# Patient Record
Sex: FEMALE | Race: WHITE | NOT HISPANIC OR LATINO | Employment: OTHER | URBAN - METROPOLITAN AREA
[De-identification: names, ages, dates, MRNs, and addresses within clinical notes are randomized per-mention and may not be internally consistent; named-entity substitution may affect disease eponyms.]

---

## 2023-10-07 ENCOUNTER — APPOINTMENT (OUTPATIENT)
Dept: GENERAL RADIOLOGY | Facility: CLINIC | Age: 68
DRG: 516 | End: 2023-10-07
Attending: EMERGENCY MEDICINE
Payer: MEDICARE

## 2023-10-07 ENCOUNTER — APPOINTMENT (OUTPATIENT)
Dept: CT IMAGING | Facility: CLINIC | Age: 68
DRG: 516 | End: 2023-10-07
Attending: EMERGENCY MEDICINE
Payer: MEDICARE

## 2023-10-07 ENCOUNTER — HOSPITAL ENCOUNTER (INPATIENT)
Facility: CLINIC | Age: 68
LOS: 5 days | Discharge: NURSING, REHAB OR LONG-TERM CARE | DRG: 516 | End: 2023-10-12
Attending: EMERGENCY MEDICINE | Admitting: HOSPITALIST
Payer: MEDICARE

## 2023-10-07 DIAGNOSIS — S52.502A CLOSED FRACTURE OF DISTAL END OF LEFT RADIUS, UNSPECIFIED FRACTURE MORPHOLOGY, INITIAL ENCOUNTER: ICD-10-CM

## 2023-10-07 DIAGNOSIS — S43.005A SHOULDER DISLOCATION, LEFT, INITIAL ENCOUNTER: ICD-10-CM

## 2023-10-07 DIAGNOSIS — S82.042A CLOSED DISPLACED COMMINUTED FRACTURE OF LEFT PATELLA, INITIAL ENCOUNTER: ICD-10-CM

## 2023-10-07 DIAGNOSIS — S42.292A OTHER CLOSED DISPLACED FRACTURE OF PROXIMAL END OF LEFT HUMERUS, INITIAL ENCOUNTER: ICD-10-CM

## 2023-10-07 DIAGNOSIS — S82.102A CLOSED FRACTURE OF PROXIMAL END OF LEFT TIBIA, UNSPECIFIED FRACTURE MORPHOLOGY, INITIAL ENCOUNTER: ICD-10-CM

## 2023-10-07 LAB
ANION GAP SERPL CALCULATED.3IONS-SCNC: 11 MMOL/L (ref 7–15)
BASO+EOS+MONOS # BLD AUTO: NORMAL 10*3/UL
BASO+EOS+MONOS NFR BLD AUTO: NORMAL %
BASOPHILS # BLD AUTO: 0 10E3/UL (ref 0–0.2)
BASOPHILS NFR BLD AUTO: 1 %
BUN SERPL-MCNC: 17.4 MG/DL (ref 8–23)
CALCIUM SERPL-MCNC: 8.9 MG/DL (ref 8.8–10.2)
CHLORIDE SERPL-SCNC: 104 MMOL/L (ref 98–107)
CREAT SERPL-MCNC: 1 MG/DL (ref 0.51–0.95)
DEPRECATED HCO3 PLAS-SCNC: 25 MMOL/L (ref 22–29)
EGFRCR SERPLBLD CKD-EPI 2021: 60 ML/MIN/1.73M2
EOSINOPHIL # BLD AUTO: 0.4 10E3/UL (ref 0–0.7)
EOSINOPHIL NFR BLD AUTO: 5 %
ERYTHROCYTE [DISTWIDTH] IN BLOOD BY AUTOMATED COUNT: 12.8 % (ref 10–15)
GLUCOSE SERPL-MCNC: 124 MG/DL (ref 70–99)
HCT VFR BLD AUTO: 41.2 % (ref 35–47)
HGB BLD-MCNC: 14.1 G/DL (ref 11.7–15.7)
IMM GRANULOCYTES # BLD: 0 10E3/UL
IMM GRANULOCYTES NFR BLD: 0 %
LYMPHOCYTES # BLD AUTO: 2.2 10E3/UL (ref 0.8–5.3)
LYMPHOCYTES NFR BLD AUTO: 31 %
MCH RBC QN AUTO: 31.8 PG (ref 26.5–33)
MCHC RBC AUTO-ENTMCNC: 34.2 G/DL (ref 31.5–36.5)
MCV RBC AUTO: 93 FL (ref 78–100)
MONOCYTES # BLD AUTO: 0.4 10E3/UL (ref 0–1.3)
MONOCYTES NFR BLD AUTO: 6 %
NEUTROPHILS # BLD AUTO: 4 10E3/UL (ref 1.6–8.3)
NEUTROPHILS NFR BLD AUTO: 57 %
NRBC # BLD AUTO: 0 10E3/UL
NRBC BLD AUTO-RTO: 0 /100
PLATELET # BLD AUTO: 267 10E3/UL (ref 150–450)
POTASSIUM SERPL-SCNC: 3.7 MMOL/L (ref 3.4–5.3)
RBC # BLD AUTO: 4.43 10E6/UL (ref 3.8–5.2)
SODIUM SERPL-SCNC: 140 MMOL/L (ref 135–145)
WBC # BLD AUTO: 7 10E3/UL (ref 4–11)

## 2023-10-07 PROCEDURE — 99223 1ST HOSP IP/OBS HIGH 75: CPT | Mod: AI | Performed by: HOSPITALIST

## 2023-10-07 PROCEDURE — 99285 EMERGENCY DEPT VISIT HI MDM: CPT | Mod: 25

## 2023-10-07 PROCEDURE — 85025 COMPLETE CBC W/AUTO DIFF WBC: CPT | Performed by: EMERGENCY MEDICINE

## 2023-10-07 PROCEDURE — 73060 X-RAY EXAM OF HUMERUS: CPT | Mod: LT

## 2023-10-07 PROCEDURE — 96375 TX/PRO/DX INJ NEW DRUG ADDON: CPT

## 2023-10-07 PROCEDURE — 250N000011 HC RX IP 250 OP 636: Mod: JZ | Performed by: HOSPITALIST

## 2023-10-07 PROCEDURE — 36415 COLL VENOUS BLD VENIPUNCTURE: CPT | Performed by: EMERGENCY MEDICINE

## 2023-10-07 PROCEDURE — 999N000055 HC STATISTIC END TITIAL CO2 MONITORING

## 2023-10-07 PROCEDURE — 258N000003 HC RX IP 258 OP 636: Performed by: HOSPITALIST

## 2023-10-07 PROCEDURE — 25605 CLTX DST RDL FX/EPHYS SEP W/: CPT | Mod: LT

## 2023-10-07 PROCEDURE — 999N000157 HC STATISTIC RCP TIME EA 10 MIN

## 2023-10-07 PROCEDURE — 250N000011 HC RX IP 250 OP 636: Mod: JZ

## 2023-10-07 PROCEDURE — 250N000011 HC RX IP 250 OP 636: Performed by: EMERGENCY MEDICINE

## 2023-10-07 PROCEDURE — 73590 X-RAY EXAM OF LOWER LEG: CPT | Mod: LT

## 2023-10-07 PROCEDURE — 120N000001 HC R&B MED SURG/OB

## 2023-10-07 PROCEDURE — 96374 THER/PROPH/DIAG INJ IV PUSH: CPT | Mod: 59

## 2023-10-07 PROCEDURE — 2W3DX1Z IMMOBILIZATION OF LEFT LOWER ARM USING SPLINT: ICD-10-PCS | Performed by: EMERGENCY MEDICINE

## 2023-10-07 PROCEDURE — 73200 CT UPPER EXTREMITY W/O DYE: CPT | Mod: LT,MA

## 2023-10-07 PROCEDURE — 73560 X-RAY EXAM OF KNEE 1 OR 2: CPT | Mod: LT

## 2023-10-07 PROCEDURE — 73030 X-RAY EXAM OF SHOULDER: CPT | Mod: LT

## 2023-10-07 PROCEDURE — 999N000065 XR SHOULDER LEFT 2 VIEWS

## 2023-10-07 PROCEDURE — 36415 COLL VENOUS BLD VENIPUNCTURE: CPT | Performed by: HOSPITALIST

## 2023-10-07 PROCEDURE — 73000 X-RAY EXAM OF COLLAR BONE: CPT | Mod: LT

## 2023-10-07 PROCEDURE — 73090 X-RAY EXAM OF FOREARM: CPT | Mod: LT

## 2023-10-07 PROCEDURE — 80048 BASIC METABOLIC PNL TOTAL CA: CPT | Performed by: EMERGENCY MEDICINE

## 2023-10-07 PROCEDURE — 73700 CT LOWER EXTREMITY W/O DYE: CPT | Mod: LT,MA

## 2023-10-07 RX ORDER — OXYCODONE HYDROCHLORIDE 5 MG/1
5 TABLET ORAL EVERY 4 HOURS PRN
Status: DISCONTINUED | OUTPATIENT
Start: 2023-10-07 | End: 2023-10-09

## 2023-10-07 RX ORDER — ONDANSETRON 2 MG/ML
4 INJECTION INTRAMUSCULAR; INTRAVENOUS EVERY 6 HOURS PRN
Status: DISCONTINUED | OUTPATIENT
Start: 2023-10-07 | End: 2023-10-09

## 2023-10-07 RX ORDER — ACETAMINOPHEN 325 MG/1
975 TABLET ORAL EVERY 8 HOURS
Status: DISCONTINUED | OUTPATIENT
Start: 2023-10-07 | End: 2023-10-12 | Stop reason: HOSPADM

## 2023-10-07 RX ORDER — PROPOFOL 10 MG/ML
1 INJECTION, EMULSION INTRAVENOUS ONCE
Status: COMPLETED | OUTPATIENT
Start: 2023-10-07 | End: 2023-10-07

## 2023-10-07 RX ORDER — HYDROMORPHONE HCL IN WATER/PF 6 MG/30 ML
0.2 PATIENT CONTROLLED ANALGESIA SYRINGE INTRAVENOUS ONCE
Status: COMPLETED | OUTPATIENT
Start: 2023-10-07 | End: 2023-10-07

## 2023-10-07 RX ORDER — HYDROMORPHONE HCL IN WATER/PF 6 MG/30 ML
0.2 PATIENT CONTROLLED ANALGESIA SYRINGE INTRAVENOUS
Status: DISCONTINUED | OUTPATIENT
Start: 2023-10-07 | End: 2023-10-12 | Stop reason: HOSPADM

## 2023-10-07 RX ORDER — LATANOPROST 50 UG/ML
1 SOLUTION/ DROPS OPHTHALMIC DAILY
COMMUNITY

## 2023-10-07 RX ORDER — SODIUM CHLORIDE 9 MG/ML
INJECTION, SOLUTION INTRAVENOUS CONTINUOUS
Status: DISCONTINUED | OUTPATIENT
Start: 2023-10-07 | End: 2023-10-11

## 2023-10-07 RX ORDER — TIMOLOL MALEATE 5 MG/ML
1 SOLUTION/ DROPS OPHTHALMIC EVERY MORNING
COMMUNITY

## 2023-10-07 RX ORDER — ONDANSETRON 4 MG/1
4 TABLET, ORALLY DISINTEGRATING ORAL EVERY 6 HOURS PRN
Status: DISCONTINUED | OUTPATIENT
Start: 2023-10-07 | End: 2023-10-09

## 2023-10-07 RX ORDER — HYDROMORPHONE HCL IN WATER/PF 6 MG/30 ML
PATIENT CONTROLLED ANALGESIA SYRINGE INTRAVENOUS
Status: COMPLETED
Start: 2023-10-07 | End: 2023-10-07

## 2023-10-07 RX ORDER — FLUMAZENIL 0.1 MG/ML
0.2 INJECTION, SOLUTION INTRAVENOUS
Status: ACTIVE | OUTPATIENT
Start: 2023-10-07 | End: 2023-10-08

## 2023-10-07 RX ADMIN — HYDROMORPHONE HYDROCHLORIDE 0.2 MG: 0.2 INJECTION, SOLUTION INTRAMUSCULAR; INTRAVENOUS; SUBCUTANEOUS at 19:30

## 2023-10-07 RX ADMIN — PROPOFOL 70 MG: 10 INJECTION, EMULSION INTRAVENOUS at 15:30

## 2023-10-07 RX ADMIN — SODIUM CHLORIDE: 9 INJECTION, SOLUTION INTRAVENOUS at 20:24

## 2023-10-07 RX ADMIN — HYDROMORPHONE HYDROCHLORIDE 0.2 MG: 0.2 INJECTION, SOLUTION INTRAMUSCULAR; INTRAVENOUS; SUBCUTANEOUS at 11:44

## 2023-10-07 RX ADMIN — HYDROMORPHONE HYDROCHLORIDE 0.2 MG: 0.2 INJECTION, SOLUTION INTRAMUSCULAR; INTRAVENOUS; SUBCUTANEOUS at 22:05

## 2023-10-07 ASSESSMENT — ACTIVITIES OF DAILY LIVING (ADL)
ADLS_ACUITY_SCORE: 39

## 2023-10-07 NOTE — ED NOTES
[facility]  ED Nurse Handoff Report    ED Chief complaint: Fall  . ED Diagnosis:   Final diagnoses:   Other closed displaced fracture of proximal end of left humerus, initial encounter   Closed fracture of distal end of left radius, unspecified fracture morphology, initial encounter   Closed displaced comminuted fracture of left patella, initial encounter   Closed fracture of proximal end of left tibia, unspecified fracture morphology, initial encounter       Allergies:   Allergies   Allergen Reactions    Codeine        Code Status: Full Code    Activity level - Baseline/Home:  independent and walker.  Activity Level - Current:   assist of 2.   Lift room needed: No.   Bariatric: No   Needed: No   Isolation: No.   Infection: Not Applicable.     Respiratory status: Room air    Vital Signs (within 30 minutes):   Vitals:    10/07/23 1234 10/07/23 1300 10/07/23 1345 10/07/23 1400   BP: (!) 192/88 (!) 193/82 (!) 189/77 (!) 192/88   Pulse: 52 51 55 56   Resp:       Temp:       TempSrc:       SpO2: 100% 100% 100% 100%       Cardiac Rhythm:  ,      Pain level:    Patient confused: No.   Patient Falls Risk: patient and family education.   Elimination Status:  not yet       Focused Assessment:      Musculoskeletal (Adult)Musculoskeletal WDL: all (pt comes in from [facility] where she fell forward onto knee then onto her arm that wAs straightened out, pt assisted up to sit on walker by 3 people. pt unable to straighten left leg and unable to move left arm due to pain.)General Mobility: significantly impairedExtremity Movement: LUE; LLELUE Extremity Movement: active ROM severely impairedLLE Extremity Movement: active ROM severely impaired       Abnormal Results:   Labs Ordered and Resulted from Time of ED Arrival to Time of ED Departure   BASIC METABOLIC PANEL - Abnormal       Result Value    Sodium 140      Potassium 3.7      Chloride 104      Carbon Dioxide (CO2) 25      Anion Gap 11      Urea  Nitrogen 17.4      Creatinine 1.00 (*)     GFR Estimate 60 (*)     Calcium 8.9      Glucose 124 (*)    CBC WITH PLATELETS AND DIFFERENTIAL    WBC Count 7.0      RBC Count 4.43      Hemoglobin 14.1      Hematocrit 41.2      MCV 93      MCH 31.8      MCHC 34.2      RDW 12.8      Platelet Count 267      % Neutrophils 57      % Lymphocytes 31      % Monocytes 6      Mids % (Monos, Eos, Basos)        % Eosinophils 5      % Basophils 1      % Immature Granulocytes 0      NRBCs per 100 WBC 0      Absolute Neutrophils 4.0      Absolute Lymphocytes 2.2      Absolute Monocytes 0.4      Mids Abs (Monos, Eos, Basos)        Absolute Eosinophils 0.4      Absolute Basophils 0.0      Absolute Immature Granulocytes 0.0      Absolute NRBCs 0.0          Radius/Ulna XR,  PA &LAT, left   Final Result   IMPRESSION: Acute distal radial metaphysis fracture with mild impaction and dorsal displacement. There is intra-articular extension. Subtle linear lucency in the distal ulna on oblique view is concerning for an acute nondisplaced fracture. Surrounding    soft tissue swelling. There is normal joint alignment. Osteopenia. Vascular calcification.      CT Tibia Fibula Lower Leg Left wo Contrast   Final Result   IMPRESSION:   1.  Acute comminuted and displaced patellar fracture.   2.  Lateral tibial plateau impaction fracture posteriorly.   3.  Nondisplaced fracture line extends to the medial tibial plateau and tibial tuberosity.    4.  Large lipohemarthrosis.   5.  Other ancillary findings as described above.         XR Knee Left 1/2 Views   Final Result   IMPRESSION: Comminuted distracted left patella fracture. Left knee lipohemarthrosis. No distal femur, proximal tibia or proximal fibula fracture. Mild degenerative change in the medial and patellofemoral compartments of the left knee. Arterial    calcification. Knee soft tissue swelling.      NOTE: ABNORMAL REPORT      THE DICTATION ABOVE DESCRIBES AN ABNORMALITY FOR WHICH FOLLOW-UP IS  NEEDED.          XR Tibia and Fibula Left 2 Views   Final Result   IMPRESSION: Anatomic alignment left tibia and fibula. Subtle linear lucencies in the proximal left tibial metaphysis could represent summation artifact or nondisplaced fracture. No definitive fibula fracture. No focal periosteal reaction. Diffuse bone    demineralization. Arterial calcification and soft tissue calcinosis. Mid to distal leg soft tissue swelling.         XR Shoulder Left G/E 3 Views   Final Result   IMPRESSION: Mildly impacted and displaced left proximal humerus fracture centered at the humeral neck. Fracture line extension likely into the greater tuberosity. No AC separation or glenohumeral joint dislocation. Moderate acromioclavicular joint    osteoarthritis. Mild glenohumeral joint space narrowing. Small lung volumes with left basilar atelectasis. Mid to distal left humerus appears intact. No left elbow joint malalignment. Subacromial spur.      NOTE: ABNORMAL REPORT      THE DICTATION ABOVE DESCRIBES AN ABNORMALITY FOR WHICH FOLLOW-UP IS NEEDED.          Humerus XR,  G/E 2 views, left   Final Result   IMPRESSION: Mildly impacted and displaced left proximal humerus fracture centered at the humeral neck. Fracture line extension likely into the greater tuberosity. No AC separation or glenohumeral joint dislocation. Moderate acromioclavicular joint    osteoarthritis. Mild glenohumeral joint space narrowing. Small lung volumes with left basilar atelectasis. Mid to distal left humerus appears intact. No left elbow joint malalignment. Subacromial spur.      NOTE: ABNORMAL REPORT      THE DICTATION ABOVE DESCRIBES AN ABNORMALITY FOR WHICH FOLLOW-UP IS NEEDED.          Clavicle XR, left   Final Result   IMPRESSION: No acute displaced left clavicle fracture identified. No clavicle joint malalignment. Redemonstrated is a left proximal humerus fracture. Degenerative change left AC joint. Subacromial spur. Degenerative change visualized  cervicothoracic    spine.             Treatments provided: PIV,LABS, DILAUDID, XRAY, CT,  Family Comments: AT BEDSIDE  OBS brochure/video discussed/provided to patient:  No  ED Medications:   Medications   HYDROmorphone (DILAUDID) injection 0.2 mg (0 mg Intravenous Hold 10/7/23 1144)   HYDROmorphone (DILAUDID) 0.2 MG/ML injection (0.2 mg  $Given 10/7/23 1144)       Drips infusing:  No  For the majority of the shift this patient was Green.   Interventions performed were NONE.    Sepsis treatment initiated: No    Cares/treatment/interventions/medications to be completed following ED care: VITALS, POSITIONING, PULM. TOILET, POSSIBLY SURGERY, PAIN MEDS, BLOOD PRESSURE.    ED Nurse Name: Alyssa Peres RN  2:07 PM  RECEIVING UNIT ED HANDOFF REVIEW    Above ED Nurse Handoff Report was reviewed: Yes  Reviewed by: Heavenly Mathis RN on October 7, 2023 at 9:03 PM

## 2023-10-07 NOTE — PROGRESS NOTES
An ETCO2 monitor was placed on the pt with 2-5LPM bled in. The Ambu bag, suction, and airways were setup and present in the room, but not needed. Pt was able to maintain airway throughout the procedure with no intervention needed. ETCO2 levels were maintained between 23-38.        Brian Kc, RT

## 2023-10-07 NOTE — ED PROVIDER NOTES
History     Chief Complaint:  Fall    HPI   Maura Downing is a 68 year old female presenting to the emergency department for evaluation of a fall via EMS.  Patient reports she was shopping at [employer] earlier today when she slipped on the ground, and subsequently fell forward.  She landed primarily on her left knee, and also braced with her left arm.  She noted immediate onset of pain to her left knee, as well as left shoulder girdle.  She also notes pain just posterior to the shoulder.  She denies head injury nor loss of consciousness.  Denies injuries to her right arm, or right leg.  She has been unable to bear weight since the time of injury.  EMS was called to the scene, and she required 3 people to assist with standing.  She was given 100 mcg of fentanyl in route, which brought her pain from 9-8/10.  She is not on anticoagulation.  She denies any neck or lower back pain.      Independent Historian:   Spouse/Partner - They report fall at [employer] earlier today    Review of External Notes:   None       Medications:    Denies current medications.    Past Medical History:    History of COVID    Past Surgical History:    No past surgical history on file.     Physical Exam   Patient Vitals for the past 24 hrs:   BP Temp Temp src Pulse Resp SpO2 Weight   10/07/23 1537 (!) 178/94 -- -- 64 12 100 % --   10/07/23 1530 -- -- -- 65 (!) 6 100 % --   10/07/23 1525 (!) 161/79 -- -- -- -- -- --   10/07/23 1516 (!) 158/97 -- -- 63 12 100 % --   10/07/23 1514 -- -- -- 61 10 100 % --   10/07/23 1511 (!) 181/85 -- -- 60 10 100 % --   10/07/23 1506 (!) 166/80 -- -- 61 12 100 % --   10/07/23 1502 (!) 154/108 -- -- 59 16 100 % --   10/07/23 1501 (!) 154/108 -- -- 56 (!) 6 97 % --   10/07/23 1459 -- -- -- 63 (!) 6 98 % --   10/07/23 1458 (!) 143/106 -- -- 63 -- 94 % --   10/07/23 1456 (!) 143/106 -- -- 60 19 100 % --   10/07/23 1442 (!) 165/87 -- -- -- -- -- 76 kg (167 lb 8 oz)   10/07/23 1400 (!) 192/88 -- -- 56 -- 100 % --   10/07/23  1345 (!) 189/77 -- -- 55 -- 100 % --   10/07/23 1300 (!) 193/82 -- -- 51 -- 100 % --   10/07/23 1234 (!) 192/88 -- -- 52 -- 100 % --   10/07/23 1142 (!) 195/95 -- -- -- -- 98 % --   10/07/23 1131 (!) 222/89 -- -- -- -- -- --   10/07/23 1127 (!) 222/89 -- -- 52 -- 97 % --   10/07/23 1122 -- 97.4  F (36.3  C) Oral 51 20 97 % --        Physical Exam  General:   Well-nourished   Speaking in full sentences  Eyes:   Conjunctiva without injection or scleral icterus  ENT:   Moist mucous membranes   Nares patent   Pinnae normal  Neck:   Full ROM   No stiffness appreciated  Resp:   Lungs CTAB   No crackles, wheezing or audible rubs   Good air movement  CV:    Normal rate, regular rhythm   S1 and S2 present   No murmur, gallop or rub  GI:   BS present   Abdomen soft without distention   Non-tender to light and deep palpation   No guarding or rebound tenderness  Skin:   Warm, dry, well perfused   No rashes or open wounds on exposed skin  MSK:   LUE:   Tenderness to palpation about proximal humerus and distal clavicle   Arm held in adduction   No obvious deformity   No elbow or wrist tenderness   2+ radial pulse   Compartments about upper and lower are are soft   LLE:   No tenderness about left hip   Tenderness and swelling about left knee with deformity   Extremity warm, well-perfused with 2+ DP and PT pulse  Neuro:   Alert   Answers questions appropriately   Decreased sensation to light touch over LLE distal to knee   Able to wiggle toes  Psych:   Normal affect, normal mood      Emergency Department Course   Imaging:  XR Shoulder Left 2 Views   Final Result   IMPRESSION: Proximal humerus fracture with similar displacement and impaction. There is mild inferior subluxation of the humeral head relative to the glenoid. Otherwise unchanged.      Radius/Ulna XR,  PA &LAT, left   Final Result   IMPRESSION: Acute distal radial metaphysis fracture with mild impaction and dorsal displacement. There is intra-articular extension. Subtle  linear lucency in the distal ulna on oblique view is concerning for an acute nondisplaced fracture. Surrounding    soft tissue swelling. There is normal joint alignment. Osteopenia. Vascular calcification.      CT Tibia Fibula Lower Leg Left wo Contrast   Final Result   IMPRESSION:   1.  Acute comminuted and displaced patellar fracture.   2.  Lateral tibial plateau impaction fracture posteriorly.   3.  Nondisplaced fracture line extends to the medial tibial plateau and tibial tuberosity.    4.  Large lipohemarthrosis.   5.  Other ancillary findings as described above.         XR Knee Left 1/2 Views   Final Result   IMPRESSION: Comminuted distracted left patella fracture. Left knee lipohemarthrosis. No distal femur, proximal tibia or proximal fibula fracture. Mild degenerative change in the medial and patellofemoral compartments of the left knee. Arterial    calcification. Knee soft tissue swelling.      NOTE: ABNORMAL REPORT      THE DICTATION ABOVE DESCRIBES AN ABNORMALITY FOR WHICH FOLLOW-UP IS NEEDED.          XR Tibia and Fibula Left 2 Views   Final Result   IMPRESSION: Anatomic alignment left tibia and fibula. Subtle linear lucencies in the proximal left tibial metaphysis could represent summation artifact or nondisplaced fracture. No definitive fibula fracture. No focal periosteal reaction. Diffuse bone    demineralization. Arterial calcification and soft tissue calcinosis. Mid to distal leg soft tissue swelling.         XR Shoulder Left G/E 3 Views   Final Result   IMPRESSION: Mildly impacted and displaced left proximal humerus fracture centered at the humeral neck. Fracture line extension likely into the greater tuberosity. No AC separation or glenohumeral joint dislocation. Moderate acromioclavicular joint    osteoarthritis. Mild glenohumeral joint space narrowing. Small lung volumes with left basilar atelectasis. Mid to distal left humerus appears intact. No left elbow joint malalignment. Subacromial spur.       NOTE: ABNORMAL REPORT      THE DICTATION ABOVE DESCRIBES AN ABNORMALITY FOR WHICH FOLLOW-UP IS NEEDED.          Humerus XR,  G/E 2 views, left   Final Result   IMPRESSION: Mildly impacted and displaced left proximal humerus fracture centered at the humeral neck. Fracture line extension likely into the greater tuberosity. No AC separation or glenohumeral joint dislocation. Moderate acromioclavicular joint    osteoarthritis. Mild glenohumeral joint space narrowing. Small lung volumes with left basilar atelectasis. Mid to distal left humerus appears intact. No left elbow joint malalignment. Subacromial spur.      NOTE: ABNORMAL REPORT      THE DICTATION ABOVE DESCRIBES AN ABNORMALITY FOR WHICH FOLLOW-UP IS NEEDED.          Clavicle XR, left   Final Result   IMPRESSION: No acute displaced left clavicle fracture identified. No clavicle joint malalignment. Redemonstrated is a left proximal humerus fracture. Degenerative change left AC joint. Subacromial spur. Degenerative change visualized cervicothoracic    spine.         CT Shoulder Left w/o Contrast    (Results Pending)      Report per radiology    Laboratory:  Labs Ordered and Resulted from Time of ED Arrival to Time of ED Departure   BASIC METABOLIC PANEL - Abnormal       Result Value    Sodium 140      Potassium 3.7      Chloride 104      Carbon Dioxide (CO2) 25      Anion Gap 11      Urea Nitrogen 17.4      Creatinine 1.00 (*)     GFR Estimate 60 (*)     Calcium 8.9      Glucose 124 (*)    CBC WITH PLATELETS AND DIFFERENTIAL    WBC Count 7.0      RBC Count 4.43      Hemoglobin 14.1      Hematocrit 41.2      MCV 93      MCH 31.8      MCHC 34.2      RDW 12.8      Platelet Count 267      % Neutrophils 57      % Lymphocytes 31      % Monocytes 6      Mids % (Monos, Eos, Basos)        % Eosinophils 5      % Basophils 1      % Immature Granulocytes 0      NRBCs per 100 WBC 0      Absolute Neutrophils 4.0      Absolute Lymphocytes 2.2      Absolute Monocytes 0.4       Mids Abs (Monos, Eos, Basos)        Absolute Eosinophils 0.4      Absolute Basophils 0.0      Absolute Immature Granulocytes 0.0      Absolute NRBCs 0.0          Procedures     Sedation     Procedure: Procedural Sedation    Sedation Level: Deep    Indication: Joint reduction    Consent: Written from Patient     Universal protocol: Universal protocol was followed and time out conducted just prior to starting procedure, confirming patient identity, site/side, procedure, patient position, and availability of correct equipment and implants.     Last PO Intake: Emergent procedure    ASA Class: Class 2 - A patient with mild systemic disease     Exam:  Mallampati:  Grade 1 - Soft palate, uvula, and pillars visible    Lungs: Clear   Heart: Regular rate and rhythm     Medication: Propofol  Dose: 70 mg     Monitoring:  Monitoring consisted of heart rate, cardiac, continuous pulse oximetry, frequent blood pressure checks.   There was constant attendance by RN until patient recovered and constant attendance by physician until patient stable.   Intubation and emergency airway equipment available.     Response: Vital signs stable, oxygen saturations greater than 92%       Patient Status: Post procedure patient was alert.     Total Physician Drug Administration / Monitoring Time: 11 minutes.     Patient was monitored during recovery and returned to pre-procedure baseline.         Splint Placement     Procedure: Splint Placement     Indication: Fracture    Consent: Verbal     Location: Left Wrist    Procedure detail:   Splint was applied by Myself  Splint type: Sugar-tong   Splint materilal: Plaster  After placement I checked and adjusted the fit as needed to ensure proper positioning/fit   Sensation and circulation are intact after splint placement     Patient Status: The patient tolerated the procedure well: Yes. There were no complications      Emergency Department Course & Assessments:             Interventions:  Medications    flumazenil (ROMAZICON) injection 0.2 mg (has no administration in time range)   HYDROmorphone (DILAUDID) injection 0.2 mg (0.2 mg Intravenous $Given 10/7/23 1144)   HYDROmorphone (DILAUDID) 0.2 MG/ML injection (0.2 mg  $Given 10/7/23 1144)   propofol (DIPRIVAN) injection 10 mg/mL vial (70 mg Intravenous $Given 10/7/23 1530)        Assessments:  See below    Independent Interpretation (X-rays, CTs, rhythm strip):  XR reviewed demonstrating patellar fracture    Consultations/Discussion of Management or Tests:   ED Course as of 10/07/23 1707   Sat Oct 07, 2023   1244 Patient re-evaluated   1422 Spoke with Dr. Felton from orthopedic surgery.  He is concerned for dislocation of the left shoulder.   1429 Patient re-checked.   1541 Procedural sedation completed   1653 Spoke with Dr. Felton from orthopedic [facility]       Social Determinants of Health affecting care:   None    Disposition:  The patient was admitted to the hospital under the care of Dr. English.     Impression & Plan        Medical Decision Making:  Maura Downing is a 68 yr old F presenting to the emergency department for evaluation of a fall.  VS on presentation reveal elevated BP, which improved during patient's emergency department course.  Patient's history consistent with a mechanical fall.  She sustained injuries primarily to her left lower extremity, as well as left upper extremity.  She denies head injury nor loss of consciousness.  Initial radiographs were obtained, demonstrating a comminuted distracted left patellar fracture, left knee lipohemarthrosis, though no knee dislocation.  Subsequent CT of the tib-fib confirms acute, comminuted displaced patellar fracture as well as a lateral tibial plateau impaction fracture posteriorly.  A additional nondisplaced fracture line extends to the medial tibial plateau and tibial tuberosity.  Patient additionally has an acute distal radial metaphyseal fracture with impaction and mild dorsal displacement as well  as intra-articular extension.  Based on imaging, do not feel further reduction maneuver indicated.  Sugar-tong splint applied and patient feeling improved with splint application.  X-ray of the shoulder demonstrates a mildly impacted, displaced left proximal humerus fracture centered at the humeral neck, with fracture extension into the greater tuberosity.  Per radiology, no clear dislocation, though following conversation with orthopedic surgery, who has reviewed imaging, it was felt that patient did have anterior subluxation versus dislocation of the glenohumeral joint.  This was discussed at length with the patient and spouse.  She underwent procedural sedation following informed verbal and written consent.  Unfortunately, repeat x-ray demonstrated persistence and subluxation/dislocation of the humeral head relative the glenoid.  This was again reviewed with orthopedic surgery, who recommended CT of the shoulder for further clarification, though do not anticipate surgical intervention in the OR this evening.  As such, we will plan for hospital admission for orthopedic consultation.  She is to remain n.p.o. after midnight.  Neurovascular status intact at this time.  Patient's questions have been answered prior to admission.      Diagnosis:    ICD-10-CM    1. Other closed displaced fracture of proximal end of left humerus, initial encounter  S42.292A       2. Closed fracture of distal end of left radius, unspecified fracture morphology, initial encounter  S52.502A       3. Closed displaced comminuted fracture of left patella, initial encounter  S82.042A       4. Closed fracture of proximal end of left tibia, unspecified fracture morphology, initial encounter  S82.102A       5. Shoulder dislocation, left, initial encounter  S43.005A            10/7/2023   Claus Wnag MD Roach, Brian Donald, MD  10/07/23 1710

## 2023-10-07 NOTE — H&P
"[facility]    History and Physical  Hospitalist       Date of Admission:  10/7/2023    Assessment & Plan   Maura Downing is a 68 year old female with glaucoma who presents after a fall.    #Mechanical Fall c/b left humeral fracture with dislocation of humeral head, left radial fracture along with left patellar fracture: Patient notes that she was hospitalized with COVID-19 2 years ago and since that time she has been using a walker to ambulate as \"my balance has not been quite right.\"  She notes that her last fall was in the early summer when she tripped over something.  Patient was shopping at [employer] on the day of admission when she slipped on the ground and fell forward.  She landed on her left knee and left arm.  She had immediate pain to her left knee and her left shoulder along with left wrist.  There is no loss of consciousness.  No preceding palpitations or chest pain.  She denies any chest pain.  No headache.  No neck or back pain.  She did not hit her head.  She was unable to bear weight since the injury.  She is not on anticoagulation.  EMS was called and she was given 100 mcg of fentanyl in route.  -ER, patient afebrile and nontachycardic.  Hypertensive with systolic blood pressures anywhere from 160-200 range.  Saturating okay on room air.  CBC and BMP unremarkable.  Initial x-ray of the left clavicle, humerus and shoulder showed left proximal humeral fracture with extension into the greater tuberosity.  X-ray of the left forearm also showed acute distal radial metaphysis fracture.  X-ray of the tibia and fibula showed subtle lucencies in the proximal left tibial metaphysis that could represent a nondisplaced fracture.  X-ray of the knee showed commuted distracted left patellar fracture.  She underwent a CT tibia and fibula that showed acute commuted and displaced patellar fracture with a lateral tibial plateau impaction fracture posteriorly.  ER physician discussed with on call " "orthopedics who noted that the humeral head was dislocated.  She underwent a left shoulder reduction in the ER.  Repeat x-ray is in process.  She was given pain medications.  -ER physician is following up with orthopedics with repeat left shoulder x-ray.  Suspect left humeral head is still dislocated and she will need a CT of the humeral head.  She may need procedure tomorrow with orthopedics.  -We will make the patient n.p.o. at midnight, IV fluids, as needed pain medications available.  -Bedrest overnight.    Addendum: Discussed with Dr. Felton from orthopedics.  He reviewed all the scans including her left shoulder CT.  It does look like she will require operative repair for her knee.  She will be n.p.o. at midnight.  I did order type and screen with her a.m. labs.    #Systolic murmur: Patient notes that she has not been told that she has a cardiac murmur in the past.  He had an echocardiogram in November 2021 that showed no valvular issues.  We will repeat a TTE so we have 1 within our system.  Discussed with patient and she is agreeable.    #Glaucoma: Resume eyedrops once verified    DVT Prophylaxis: Pneumatic Compression Devices  Code Status: Full Code  Dispo: Admit inpatient    Rodrigo English MD    Primary Care Physician   Physician No Ref-Primary    Chief Complaint   Fall    History is obtained from the patient, patient's chart and discussed with ER physician.  Also discussed with patient's  at bedside.    History of Present Illness   Maura Downing is a 68 year old female with glaucoma who presents after a fall.    Patient notes that she was hospitalized with COVID-19 2 years ago and since that time she has been using a walker to ambulate as \"my balance has not been quite right.\"  She notes that her last fall was in the early summer when she tripped over something.  Patient was shopping at [employer] on the day of admission when she slipped on the ground and fell forward.  She landed on her left knee and " left arm.  She had immediate pain to her left knee and her left shoulder along with left wrist.  There is no loss of consciousness.  No preceding palpitations or chest pain.  She denies any chest pain.  No headache.  No neck or back pain.  She did not hit her head.  She was unable to bear weight since the injury.  She is not on anticoagulation.  EMS was called and she was given 100 mcg of fentanyl in route.    In the ER, patient afebrile and nontachycardic.  Hypertensive with systolic blood pressures anywhere from 160-200 range.  Saturating okay on room air.  CBC and BMP unremarkable.  Initial x-ray of the left clavicle, humerus and shoulder showed left proximal humeral fracture with extension into the greater tuberosity.  X-ray of the left forearm also showed acute distal radial metaphysis fracture.  X-ray of the tibia and fibula showed subtle lucencies in the proximal left tibial metaphysis that could represent a nondisplaced fracture.  X-ray of the knee showed commuted distracted left patellar fracture.  She underwent a CT tibia and fibula that showed acute commuted and displaced patellar fracture with a lateral tibial plateau impaction fracture posteriorly.  ER physician discussed with on call orthopedics who noted that the humeral head was dislocated.  She underwent a left shoulder reduction in the ER.  Repeat x-ray is in process.  She was given pain medications.    Past Medical History    Glaucoma    Past Surgical History   None    Prior to Admission Medications   None     Allergies   Allergies   Allergen Reactions    Codeine        Social History   I have reviewed this patient's social history and updated it with pertinent information if needed. Maura Downing    Patient lives at home with her .  Uses a walker to ambulate.  Non-smoker nondrinker.    Family History   I have reviewed this patient's family history and updated it with pertinent information if needed.   No family history on file.    Review  of Systems   The 10 point Review of Systems is negative other than noted in the HPI or here.     Physical Exam   Temp: 97.4  F (36.3  C) Temp src: Oral BP: (!) 178/94 Pulse: 64   Resp: 12 SpO2: 100 % O2 Device: Nasal cannula Oxygen Delivery: 5 LPM  Vital Signs with Ranges  Temp:  [97.4  F (36.3  C)] 97.4  F (36.3  C)  Pulse:  [51-65] 64  Resp:  [6-20] 12  BP: (143-222)/() 178/94  SpO2:  [94 %-100 %] 100 %  167 lbs 8 oz    Constitutional: NAD, Nontoxic.  Nasal cannula in place.  HEENT: Normocephalic, MMM, no elevation of JVD noted  Respiratory: Nl WOB, Clear bilaterally, No wheezes, no crackles  Cardiovascular: Regular, systolic murmur is noted.  GI: BS+, NT, ND, no rebound or guarding  Lymph/Hematologic: No bruising. No cervical LAD  Skin: No rash  Musculoskeletal: Patient with left wrist and forearm in splint.  Left shoulder with some swelling noted.  Tenderness noted.  She does have sensation to her left fingers intact.  She has swelling and pain to her left knee and has pain with movement.  Distal left lower extremity sensation is intact.  Pulses are palpable.  Neurologic: A&Ox3, Answers appropriately. CNII-XII intact. Moves all extremities but keeps left arm and left leg still secondary to pain. No tremor  Psychiatric: Calm    Data   Data reviewed today:  I personally reviewed   Recent Labs   Lab 10/07/23  1145   WBC 7.0   HGB 14.1   MCV 93         POTASSIUM 3.7   CHLORIDE 104   CO2 25   BUN 17.4   CR 1.00*   ANIONGAP 11   SHARON 8.9   *       Recent Results (from the past 24 hour(s))   Clavicle XR, left    Narrative    EXAM: XR CLAVICLE LEFT 2 VIEWS  LOCATION: [facility]  DATE: 10/07/2023.    INDICATION: Fall. Pain.  COMPARISON: Shoulder and humerus radiographic exam today.      Impression    IMPRESSION: No acute displaced left clavicle fracture identified. No clavicle joint malalignment. Redemonstrated is a left proximal humerus fracture. Degenerative change left  AC joint. Subacromial spur. Degenerative change visualized cervicothoracic   spine.     Humerus XR,  G/E 2 views, left    Narrative    EXAM: XR SHOULDER LEFT G/E 3 VIEWS, XR HUMERUS LEFT G/E 2 VIEWS  LOCATION: [facility]  DATE: 10/07/2023    INDICATION: Fall. Humerus pain.  COMPARISON: None.      Impression    IMPRESSION: Mildly impacted and displaced left proximal humerus fracture centered at the humeral neck. Fracture line extension likely into the greater tuberosity. No AC separation or glenohumeral joint dislocation. Moderate acromioclavicular joint   osteoarthritis. Mild glenohumeral joint space narrowing. Small lung volumes with left basilar atelectasis. Mid to distal left humerus appears intact. No left elbow joint malalignment. Subacromial spur.    NOTE: ABNORMAL REPORT    THE DICTATION ABOVE DESCRIBES AN ABNORMALITY FOR WHICH FOLLOW-UP IS NEEDED.      XR Shoulder Left G/E 3 Views    Narrative    EXAM: XR SHOULDER LEFT G/E 3 VIEWS, XR HUMERUS LEFT G/E 2 VIEWS  LOCATION: [facility]  DATE: 10/07/2023    INDICATION: Fall. Humerus pain.  COMPARISON: None.      Impression    IMPRESSION: Mildly impacted and displaced left proximal humerus fracture centered at the humeral neck. Fracture line extension likely into the greater tuberosity. No AC separation or glenohumeral joint dislocation. Moderate acromioclavicular joint   osteoarthritis. Mild glenohumeral joint space narrowing. Small lung volumes with left basilar atelectasis. Mid to distal left humerus appears intact. No left elbow joint malalignment. Subacromial spur.    NOTE: ABNORMAL REPORT    THE DICTATION ABOVE DESCRIBES AN ABNORMALITY FOR WHICH FOLLOW-UP IS NEEDED.      XR Tibia and Fibula Left 2 Views    Narrative    EXAM: XR TIBIA AND FIBULA LEFT 2 VIEWS  LOCATION: [facility]  DATE: 10/07/2023    INDICATION: Fall. Pain.  COMPARISON: None.      Impression    IMPRESSION: Anatomic alignment  left tibia and fibula. Subtle linear lucencies in the proximal left tibial metaphysis could represent summation artifact or nondisplaced fracture. No definitive fibula fracture. No focal periosteal reaction. Diffuse bone   demineralization. Arterial calcification and soft tissue calcinosis. Mid to distal leg soft tissue swelling.     XR Knee Left 1/2 Views    Narrative    EXAM: XR KNEE LEFT 1/2 VIEWS  LOCATION: [facility]  DATE: 10/07/2023    INDICATION: Fall, pain.  COMPARISON: None.      Impression    IMPRESSION: Comminuted distracted left patella fracture. Left knee lipohemarthrosis. No distal femur, proximal tibia or proximal fibula fracture. Mild degenerative change in the medial and patellofemoral compartments of the left knee. Arterial   calcification. Knee soft tissue swelling.    NOTE: ABNORMAL REPORT    THE DICTATION ABOVE DESCRIBES AN ABNORMALITY FOR WHICH FOLLOW-UP IS NEEDED.      CT Tibia Fibula Lower Leg Left wo Contrast    Narrative    EXAM: CT TIBIA FIBULA LOWER LEG LEFT WO CONTRAST  LOCATION: [facility]  DATE: 10/7/2023    INDICATION: left proximal knee pain  COMPARISON: Radiographs from earlier today  TECHNIQUE: Noncontrast. Axial, sagittal and coronal thin-section reconstruction. Dose reduction techniques were used.     FINDINGS:     BONES AND JOINTS:  -Acute transversely oriented fracture of the mid patella with posterior displacement of the distal fracture fragment. There is intra-articular extension and multiple small fracture fragments. Acute impaction fracture of the lateral tibial plateau   posteriorly with 6 mm articular surface depression. There is a nondisplaced fracture line in the proximal tibia anteriorly which extends to the medial tibial plateau and tibial tuberosity. Mild cartilage thinning in the medial compartment. Osteopenia.   Large lipohemarthrosis. No intra-articular body.    SOFT TISSUES:  -Mild soft tissue swelling anteriorly. No  discrete fluid collection. Semimembranosus fatty muscle atrophy. Atherosclerosis.      Impression    IMPRESSION:  1.  Acute comminuted and displaced patellar fracture.  2.  Lateral tibial plateau impaction fracture posteriorly.  3.  Nondisplaced fracture line extends to the medial tibial plateau and tibial tuberosity.   4.  Large lipohemarthrosis.  5.  Other ancillary findings as described above.     Radius/Ulna XR,  PA &LAT, left    Narrative    EXAM: XR FOREARM LEFT 2 VIEWS  LOCATION: [facility]  DATE: 10/7/2023    INDICATION: pain, fall  COMPARISON: None.      Impression    IMPRESSION: Acute distal radial metaphysis fracture with mild impaction and dorsal displacement. There is intra-articular extension. Subtle linear lucency in the distal ulna on oblique view is concerning for an acute nondisplaced fracture. Surrounding   soft tissue swelling. There is normal joint alignment. Osteopenia. Vascular calcification.       Clinically Significant Risk Factors Present on Admission

## 2023-10-07 NOTE — ED NOTES
Musculoskeletal (Adult)Musculoskeletal WDL: all (pt comes in from [facility] where she fell forward onto knee then onto her arm that wAs straightened out, pt assisted up to sit on walker by 3 people. pt unable to straighten left leg and unable to move left arm due to pain.)General Mobility: significantly impairedExtremity Movement: LUE; LLELUE Extremity Movement: active ROM severely impairedLLE Extremity Movement: active ROM severely impaired

## 2023-10-07 NOTE — PHARMACY-ADMISSION MEDICATION HISTORY
Pharmacist Admission Medication History    Admission medication history is complete. The information provided in this note is only as accurate as the sources available at the time of the update.    Information Source(s): Patient via in-person    Pertinent Information: None    Changes made to PTA medication list:  Added: All  Deleted: None  Changed: None    Medication Affordability:  Not including over the counter (OTC) medications, was there a time in the past 3 months when you did not take your medications as prescribed because of cost?: No    Allergies reviewed with patient and updates made in EHR: yes    Medication History Completed By: Blaine Mills RPH 10/7/2023 5:01 PM    PTA Med List   Medication Sig Last Dose    latanoprost (XALATAN) 0.005 % ophthalmic solution Place 1 drop into both eyes daily 10/6/2023 at PM    timolol maleate (TIMOPTIC) 0.5 % ophthalmic solution Place 1 drop into both eyes every morning 10/7/2023 at AM

## 2023-10-07 NOTE — ED TRIAGE NOTES
pt comes in from [facility] where she fell forward onto knee then onto her arm that wAs straightened out, pt assisted up to sit on walker by 3 people. pt unable to straighten left leg and unable to move left arm due to pain. pt got 100mcg of fent from ems brought pain from 9/10 to 8/10. vss no LOC did not hit head. back is sore.

## 2023-10-08 ENCOUNTER — APPOINTMENT (OUTPATIENT)
Dept: CARDIOLOGY | Facility: CLINIC | Age: 68
DRG: 516 | End: 2023-10-08
Attending: HOSPITALIST
Payer: MEDICARE

## 2023-10-08 ENCOUNTER — ANESTHESIA EVENT (OUTPATIENT)
Dept: SURGERY | Facility: CLINIC | Age: 68
DRG: 516 | End: 2023-10-08
Payer: MEDICARE

## 2023-10-08 ENCOUNTER — APPOINTMENT (OUTPATIENT)
Dept: GENERAL RADIOLOGY | Facility: CLINIC | Age: 68
DRG: 516 | End: 2023-10-08
Attending: ORTHOPAEDIC SURGERY
Payer: MEDICARE

## 2023-10-08 ENCOUNTER — ANESTHESIA (OUTPATIENT)
Dept: SURGERY | Facility: CLINIC | Age: 68
DRG: 516 | End: 2023-10-08
Payer: MEDICARE

## 2023-10-08 LAB
ABO/RH(D): NORMAL
ABO/RH(D): NORMAL
ANION GAP SERPL CALCULATED.3IONS-SCNC: 11 MMOL/L (ref 7–15)
ANTIBODY SCREEN: NEGATIVE
BUN SERPL-MCNC: 22 MG/DL (ref 8–23)
CALCIUM SERPL-MCNC: 8.7 MG/DL (ref 8.8–10.2)
CHLORIDE SERPL-SCNC: 108 MMOL/L (ref 98–107)
CREAT SERPL-MCNC: 1.07 MG/DL (ref 0.51–0.95)
DEPRECATED HCO3 PLAS-SCNC: 22 MMOL/L (ref 22–29)
EGFRCR SERPLBLD CKD-EPI 2021: 56 ML/MIN/1.73M2
ERYTHROCYTE [DISTWIDTH] IN BLOOD BY AUTOMATED COUNT: 13 % (ref 10–15)
GLUCOSE SERPL-MCNC: 166 MG/DL (ref 70–99)
HCT VFR BLD AUTO: 35.4 % (ref 35–47)
HGB BLD-MCNC: 12.1 G/DL (ref 11.7–15.7)
LVEF ECHO: NORMAL
MCH RBC QN AUTO: 31.8 PG (ref 26.5–33)
MCHC RBC AUTO-ENTMCNC: 34.2 G/DL (ref 31.5–36.5)
MCV RBC AUTO: 93 FL (ref 78–100)
PLATELET # BLD AUTO: 229 10E3/UL (ref 150–450)
POTASSIUM SERPL-SCNC: 4 MMOL/L (ref 3.4–5.3)
RBC # BLD AUTO: 3.81 10E6/UL (ref 3.8–5.2)
SODIUM SERPL-SCNC: 141 MMOL/L (ref 135–145)
SPECIMEN EXPIRATION DATE: NORMAL
SPECIMEN EXPIRATION DATE: NORMAL
WBC # BLD AUTO: 11.9 10E3/UL (ref 4–11)

## 2023-10-08 PROCEDURE — 0QSF04Z REPOSITION LEFT PATELLA WITH INTERNAL FIXATION DEVICE, OPEN APPROACH: ICD-10-PCS | Performed by: ORTHOPAEDIC SURGERY

## 2023-10-08 PROCEDURE — 360N000084 HC SURGERY LEVEL 4 W/ FLUORO, PER MIN: Performed by: ORTHOPAEDIC SURGERY

## 2023-10-08 PROCEDURE — 258N000003 HC RX IP 258 OP 636: Performed by: ORTHOPAEDIC SURGERY

## 2023-10-08 PROCEDURE — 250N000009 HC RX 250: Performed by: NURSE ANESTHETIST, CERTIFIED REGISTERED

## 2023-10-08 PROCEDURE — 250N000025 HC SEVOFLURANE, PER MIN: Performed by: ORTHOPAEDIC SURGERY

## 2023-10-08 PROCEDURE — 85027 COMPLETE CBC AUTOMATED: CPT | Performed by: HOSPITALIST

## 2023-10-08 PROCEDURE — 999N000208 ECHOCARDIOGRAM COMPLETE

## 2023-10-08 PROCEDURE — 250N000011 HC RX IP 250 OP 636: Mod: JZ | Performed by: NURSE ANESTHETIST, CERTIFIED REGISTERED

## 2023-10-08 PROCEDURE — 370N000017 HC ANESTHESIA TECHNICAL FEE, PER MIN: Performed by: ORTHOPAEDIC SURGERY

## 2023-10-08 PROCEDURE — 255N000002 HC RX 255 OP 636: Performed by: HOSPITALIST

## 2023-10-08 PROCEDURE — 258N000003 HC RX IP 258 OP 636: Performed by: ANESTHESIOLOGY

## 2023-10-08 PROCEDURE — 710N000009 HC RECOVERY PHASE 1, LEVEL 1, PER MIN: Performed by: ORTHOPAEDIC SURGERY

## 2023-10-08 PROCEDURE — 272N000001 HC OR GENERAL SUPPLY STERILE: Performed by: ORTHOPAEDIC SURGERY

## 2023-10-08 PROCEDURE — 258N000003 HC RX IP 258 OP 636: Performed by: NURSE ANESTHETIST, CERTIFIED REGISTERED

## 2023-10-08 PROCEDURE — 80048 BASIC METABOLIC PNL TOTAL CA: CPT | Performed by: HOSPITALIST

## 2023-10-08 PROCEDURE — 86901 BLOOD TYPING SEROLOGIC RH(D): CPT | Performed by: HOSPITALIST

## 2023-10-08 PROCEDURE — 250N000011 HC RX IP 250 OP 636: Mod: JZ | Performed by: HOSPITALIST

## 2023-10-08 PROCEDURE — 999N000141 HC STATISTIC PRE-PROCEDURE NURSING ASSESSMENT: Performed by: ORTHOPAEDIC SURGERY

## 2023-10-08 PROCEDURE — 250N000013 HC RX MED GY IP 250 OP 250 PS 637: Performed by: ORTHOPAEDIC SURGERY

## 2023-10-08 PROCEDURE — 99232 SBSQ HOSP IP/OBS MODERATE 35: CPT | Performed by: INTERNAL MEDICINE

## 2023-10-08 PROCEDURE — 250N000009 HC RX 250: Performed by: INTERNAL MEDICINE

## 2023-10-08 PROCEDURE — 250N000011 HC RX IP 250 OP 636: Mod: JZ | Performed by: ORTHOPAEDIC SURGERY

## 2023-10-08 PROCEDURE — 36415 COLL VENOUS BLD VENIPUNCTURE: CPT | Performed by: HOSPITALIST

## 2023-10-08 PROCEDURE — 250N000013 HC RX MED GY IP 250 OP 250 PS 637: Performed by: HOSPITALIST

## 2023-10-08 PROCEDURE — C1713 ANCHOR/SCREW BN/BN,TIS/BN: HCPCS | Performed by: ORTHOPAEDIC SURGERY

## 2023-10-08 PROCEDURE — 120N000001 HC R&B MED SURG/OB

## 2023-10-08 PROCEDURE — 999N000179 XR SURGERY CARM FLUORO LESS THAN 5 MIN W STILLS: Mod: TC

## 2023-10-08 PROCEDURE — 258N000003 HC RX IP 258 OP 636: Performed by: HOSPITALIST

## 2023-10-08 PROCEDURE — 93306 TTE W/DOPPLER COMPLETE: CPT | Mod: 26 | Performed by: INTERNAL MEDICINE

## 2023-10-08 PROCEDURE — 250N000011 HC RX IP 250 OP 636: Performed by: ORTHOPAEDIC SURGERY

## 2023-10-08 PROCEDURE — 271N000001 HC OR GENERAL SUPPLY NON-STERILE: Performed by: ORTHOPAEDIC SURGERY

## 2023-10-08 PROCEDURE — 250N000011 HC RX IP 250 OP 636: Mod: JZ | Performed by: ANESTHESIOLOGY

## 2023-10-08 DEVICE — IMP ANCHOR SUTURE Q-FIX 2.8MM 25-2800: Type: IMPLANTABLE DEVICE | Site: KNEE | Status: FUNCTIONAL

## 2023-10-08 RX ORDER — FENTANYL CITRATE 50 UG/ML
25 INJECTION, SOLUTION INTRAMUSCULAR; INTRAVENOUS EVERY 5 MIN PRN
Status: DISCONTINUED | OUTPATIENT
Start: 2023-10-08 | End: 2023-10-08 | Stop reason: HOSPADM

## 2023-10-08 RX ORDER — POLYETHYLENE GLYCOL 3350 17 G/17G
17 POWDER, FOR SOLUTION ORAL DAILY
Status: DISCONTINUED | OUTPATIENT
Start: 2023-10-09 | End: 2023-10-12 | Stop reason: HOSPADM

## 2023-10-08 RX ORDER — SODIUM CHLORIDE, SODIUM LACTATE, POTASSIUM CHLORIDE, CALCIUM CHLORIDE 600; 310; 30; 20 MG/100ML; MG/100ML; MG/100ML; MG/100ML
INJECTION, SOLUTION INTRAVENOUS CONTINUOUS
Status: DISCONTINUED | OUTPATIENT
Start: 2023-10-08 | End: 2023-10-08 | Stop reason: HOSPADM

## 2023-10-08 RX ORDER — CEFAZOLIN SODIUM/WATER 2 G/20 ML
2 SYRINGE (ML) INTRAVENOUS SEE ADMIN INSTRUCTIONS
Status: DISCONTINUED | OUTPATIENT
Start: 2023-10-08 | End: 2023-10-08 | Stop reason: HOSPADM

## 2023-10-08 RX ORDER — NALOXONE HYDROCHLORIDE 0.4 MG/ML
0.4 INJECTION, SOLUTION INTRAMUSCULAR; INTRAVENOUS; SUBCUTANEOUS
Status: DISCONTINUED | OUTPATIENT
Start: 2023-10-08 | End: 2023-10-12 | Stop reason: HOSPADM

## 2023-10-08 RX ORDER — AMOXICILLIN 250 MG
1 CAPSULE ORAL 2 TIMES DAILY
Status: DISCONTINUED | OUTPATIENT
Start: 2023-10-08 | End: 2023-10-12 | Stop reason: HOSPADM

## 2023-10-08 RX ORDER — FENTANYL CITRATE 50 UG/ML
50 INJECTION, SOLUTION INTRAMUSCULAR; INTRAVENOUS EVERY 5 MIN PRN
Status: DISCONTINUED | OUTPATIENT
Start: 2023-10-08 | End: 2023-10-08 | Stop reason: HOSPADM

## 2023-10-08 RX ORDER — LABETALOL HYDROCHLORIDE 5 MG/ML
10 INJECTION, SOLUTION INTRAVENOUS
Status: DISCONTINUED | OUTPATIENT
Start: 2023-10-08 | End: 2023-10-08 | Stop reason: HOSPADM

## 2023-10-08 RX ORDER — NALOXONE HYDROCHLORIDE 0.4 MG/ML
0.2 INJECTION, SOLUTION INTRAMUSCULAR; INTRAVENOUS; SUBCUTANEOUS
Status: DISCONTINUED | OUTPATIENT
Start: 2023-10-08 | End: 2023-10-12 | Stop reason: HOSPADM

## 2023-10-08 RX ORDER — CEFAZOLIN SODIUM 1 G/3ML
1 INJECTION, POWDER, FOR SOLUTION INTRAMUSCULAR; INTRAVENOUS EVERY 8 HOURS
Status: COMPLETED | OUTPATIENT
Start: 2023-10-09 | End: 2023-10-09

## 2023-10-08 RX ORDER — FENTANYL CITRATE 50 UG/ML
INJECTION, SOLUTION INTRAMUSCULAR; INTRAVENOUS PRN
Status: DISCONTINUED | OUTPATIENT
Start: 2023-10-08 | End: 2023-10-08

## 2023-10-08 RX ORDER — DEXAMETHASONE SODIUM PHOSPHATE 4 MG/ML
INJECTION, SOLUTION INTRA-ARTICULAR; INTRALESIONAL; INTRAMUSCULAR; INTRAVENOUS; SOFT TISSUE PRN
Status: DISCONTINUED | OUTPATIENT
Start: 2023-10-08 | End: 2023-10-08

## 2023-10-08 RX ORDER — ONDANSETRON 4 MG/1
4 TABLET, ORALLY DISINTEGRATING ORAL EVERY 6 HOURS PRN
Status: DISCONTINUED | OUTPATIENT
Start: 2023-10-08 | End: 2023-10-12 | Stop reason: HOSPADM

## 2023-10-08 RX ORDER — BISACODYL 10 MG
10 SUPPOSITORY, RECTAL RECTAL DAILY PRN
Status: DISCONTINUED | OUTPATIENT
Start: 2023-10-08 | End: 2023-10-12 | Stop reason: HOSPADM

## 2023-10-08 RX ORDER — HYDRALAZINE HYDROCHLORIDE 20 MG/ML
2.5-5 INJECTION INTRAMUSCULAR; INTRAVENOUS EVERY 10 MIN PRN
Status: DISCONTINUED | OUTPATIENT
Start: 2023-10-08 | End: 2023-10-08 | Stop reason: HOSPADM

## 2023-10-08 RX ORDER — ALBUTEROL SULFATE 0.83 MG/ML
2.5 SOLUTION RESPIRATORY (INHALATION) EVERY 4 HOURS PRN
Status: DISCONTINUED | OUTPATIENT
Start: 2023-10-08 | End: 2023-10-08

## 2023-10-08 RX ORDER — PROPOFOL 10 MG/ML
INJECTION, EMULSION INTRAVENOUS PRN
Status: DISCONTINUED | OUTPATIENT
Start: 2023-10-08 | End: 2023-10-08

## 2023-10-08 RX ORDER — ONDANSETRON 2 MG/ML
INJECTION INTRAMUSCULAR; INTRAVENOUS PRN
Status: DISCONTINUED | OUTPATIENT
Start: 2023-10-08 | End: 2023-10-08

## 2023-10-08 RX ORDER — LIDOCAINE HYDROCHLORIDE 20 MG/ML
INJECTION, SOLUTION INFILTRATION; PERINEURAL PRN
Status: DISCONTINUED | OUTPATIENT
Start: 2023-10-08 | End: 2023-10-08

## 2023-10-08 RX ORDER — HYDRALAZINE HYDROCHLORIDE 20 MG/ML
2.5-5 INJECTION INTRAMUSCULAR; INTRAVENOUS EVERY 10 MIN PRN
Status: DISCONTINUED | OUTPATIENT
Start: 2023-10-08 | End: 2023-10-08

## 2023-10-08 RX ORDER — SODIUM CHLORIDE, SODIUM LACTATE, POTASSIUM CHLORIDE, CALCIUM CHLORIDE 600; 310; 30; 20 MG/100ML; MG/100ML; MG/100ML; MG/100ML
INJECTION, SOLUTION INTRAVENOUS CONTINUOUS
Status: DISCONTINUED | OUTPATIENT
Start: 2023-10-08 | End: 2023-10-11

## 2023-10-08 RX ORDER — HYDROMORPHONE HCL IN WATER/PF 6 MG/30 ML
0.4 PATIENT CONTROLLED ANALGESIA SYRINGE INTRAVENOUS EVERY 5 MIN PRN
Status: DISCONTINUED | OUTPATIENT
Start: 2023-10-08 | End: 2023-10-08

## 2023-10-08 RX ORDER — HYDROMORPHONE HCL IN WATER/PF 6 MG/30 ML
0.4 PATIENT CONTROLLED ANALGESIA SYRINGE INTRAVENOUS EVERY 5 MIN PRN
Status: DISCONTINUED | OUTPATIENT
Start: 2023-10-08 | End: 2023-10-08 | Stop reason: HOSPADM

## 2023-10-08 RX ORDER — ONDANSETRON 2 MG/ML
4 INJECTION INTRAMUSCULAR; INTRAVENOUS EVERY 30 MIN PRN
Status: DISCONTINUED | OUTPATIENT
Start: 2023-10-08 | End: 2023-10-08 | Stop reason: HOSPADM

## 2023-10-08 RX ORDER — CEFAZOLIN SODIUM/WATER 2 G/20 ML
2 SYRINGE (ML) INTRAVENOUS
Status: COMPLETED | OUTPATIENT
Start: 2023-10-08 | End: 2023-10-08

## 2023-10-08 RX ORDER — ONDANSETRON 2 MG/ML
4 INJECTION INTRAMUSCULAR; INTRAVENOUS EVERY 6 HOURS PRN
Status: DISCONTINUED | OUTPATIENT
Start: 2023-10-08 | End: 2023-10-12 | Stop reason: HOSPADM

## 2023-10-08 RX ORDER — HYDROXYZINE HYDROCHLORIDE 10 MG/1
10 TABLET, FILM COATED ORAL EVERY 6 HOURS PRN
Status: DISCONTINUED | OUTPATIENT
Start: 2023-10-08 | End: 2023-10-12 | Stop reason: HOSPADM

## 2023-10-08 RX ORDER — LIDOCAINE 40 MG/G
CREAM TOPICAL
Status: DISCONTINUED | OUTPATIENT
Start: 2023-10-08 | End: 2023-10-12 | Stop reason: HOSPADM

## 2023-10-08 RX ORDER — ONDANSETRON 4 MG/1
4 TABLET, ORALLY DISINTEGRATING ORAL EVERY 30 MIN PRN
Status: DISCONTINUED | OUTPATIENT
Start: 2023-10-08 | End: 2023-10-08 | Stop reason: HOSPADM

## 2023-10-08 RX ORDER — LATANOPROST 50 UG/ML
1 SOLUTION/ DROPS OPHTHALMIC EVERY EVENING
Status: DISCONTINUED | OUTPATIENT
Start: 2023-10-08 | End: 2023-10-12 | Stop reason: HOSPADM

## 2023-10-08 RX ORDER — PROCHLORPERAZINE MALEATE 5 MG
5 TABLET ORAL EVERY 6 HOURS PRN
Status: DISCONTINUED | OUTPATIENT
Start: 2023-10-08 | End: 2023-10-12 | Stop reason: HOSPADM

## 2023-10-08 RX ORDER — HYDROMORPHONE HCL IN WATER/PF 6 MG/30 ML
0.2 PATIENT CONTROLLED ANALGESIA SYRINGE INTRAVENOUS EVERY 5 MIN PRN
Status: DISCONTINUED | OUTPATIENT
Start: 2023-10-08 | End: 2023-10-08 | Stop reason: HOSPADM

## 2023-10-08 RX ORDER — ASPIRIN 325 MG
325 TABLET, DELAYED RELEASE (ENTERIC COATED) ORAL DAILY
Status: DISCONTINUED | OUTPATIENT
Start: 2023-10-08 | End: 2023-10-12 | Stop reason: HOSPADM

## 2023-10-08 RX ORDER — LABETALOL HYDROCHLORIDE 5 MG/ML
10 INJECTION, SOLUTION INTRAVENOUS
Status: DISCONTINUED | OUTPATIENT
Start: 2023-10-08 | End: 2023-10-08

## 2023-10-08 RX ORDER — HYDROMORPHONE HCL IN WATER/PF 6 MG/30 ML
0.2 PATIENT CONTROLLED ANALGESIA SYRINGE INTRAVENOUS EVERY 5 MIN PRN
Status: DISCONTINUED | OUTPATIENT
Start: 2023-10-08 | End: 2023-10-08

## 2023-10-08 RX ORDER — ROPIVACAINE HYDROCHLORIDE 7.5 MG/ML
INJECTION, SOLUTION EPIDURAL; PERINEURAL PRN
Status: DISCONTINUED | OUTPATIENT
Start: 2023-10-08 | End: 2023-10-08 | Stop reason: HOSPADM

## 2023-10-08 RX ORDER — GLYCOPYRROLATE 0.2 MG/ML
INJECTION, SOLUTION INTRAMUSCULAR; INTRAVENOUS PRN
Status: DISCONTINUED | OUTPATIENT
Start: 2023-10-08 | End: 2023-10-08

## 2023-10-08 RX ORDER — LIDOCAINE 40 MG/G
CREAM TOPICAL
Status: DISCONTINUED | OUTPATIENT
Start: 2023-10-08 | End: 2023-10-08 | Stop reason: HOSPADM

## 2023-10-08 RX ORDER — ALBUTEROL SULFATE 0.83 MG/ML
2.5 SOLUTION RESPIRATORY (INHALATION) EVERY 4 HOURS PRN
Status: DISCONTINUED | OUTPATIENT
Start: 2023-10-08 | End: 2023-10-08 | Stop reason: HOSPADM

## 2023-10-08 RX ORDER — TIMOLOL MALEATE 5 MG/ML
1 SOLUTION/ DROPS OPHTHALMIC EVERY MORNING
Status: DISCONTINUED | OUTPATIENT
Start: 2023-10-08 | End: 2023-10-12 | Stop reason: HOSPADM

## 2023-10-08 RX ADMIN — PROPOFOL 200 MG: 10 INJECTION, EMULSION INTRAVENOUS at 16:28

## 2023-10-08 RX ADMIN — HYDROMORPHONE HYDROCHLORIDE 0.5 MG: 1 INJECTION, SOLUTION INTRAMUSCULAR; INTRAVENOUS; SUBCUTANEOUS at 17:21

## 2023-10-08 RX ADMIN — SODIUM CHLORIDE: 9 INJECTION, SOLUTION INTRAVENOUS at 06:34

## 2023-10-08 RX ADMIN — FENTANYL CITRATE 50 MCG: 50 INJECTION INTRAMUSCULAR; INTRAVENOUS at 16:53

## 2023-10-08 RX ADMIN — Medication 2 G: at 16:24

## 2023-10-08 RX ADMIN — HYDROMORPHONE HYDROCHLORIDE 0.2 MG: 0.2 INJECTION, SOLUTION INTRAMUSCULAR; INTRAVENOUS; SUBCUTANEOUS at 11:31

## 2023-10-08 RX ADMIN — OXYCODONE HYDROCHLORIDE 5 MG: 5 TABLET ORAL at 05:54

## 2023-10-08 RX ADMIN — ACETAMINOPHEN 975 MG: 325 TABLET, FILM COATED ORAL at 05:53

## 2023-10-08 RX ADMIN — OXYCODONE HYDROCHLORIDE 5 MG: 5 TABLET ORAL at 22:30

## 2023-10-08 RX ADMIN — LATANOPROST 1 DROP: 50 SOLUTION OPHTHALMIC at 19:54

## 2023-10-08 RX ADMIN — HYDROMORPHONE HYDROCHLORIDE 0.2 MG: 0.2 INJECTION, SOLUTION INTRAMUSCULAR; INTRAVENOUS; SUBCUTANEOUS at 03:17

## 2023-10-08 RX ADMIN — GLYCOPYRROLATE 0.2 MG: 0.2 INJECTION, SOLUTION INTRAMUSCULAR; INTRAVENOUS at 16:51

## 2023-10-08 RX ADMIN — HUMAN ALBUMIN MICROSPHERES AND PERFLUTREN 4 ML: 10; .22 INJECTION, SOLUTION INTRAVENOUS at 13:04

## 2023-10-08 RX ADMIN — PHENYLEPHRINE HYDROCHLORIDE 100 MCG: 10 INJECTION INTRAVENOUS at 16:37

## 2023-10-08 RX ADMIN — HYDROXYZINE HYDROCHLORIDE 10 MG: 10 TABLET ORAL at 22:30

## 2023-10-08 RX ADMIN — ASPIRIN 325 MG: 325 TABLET, COATED ORAL at 19:50

## 2023-10-08 RX ADMIN — SODIUM CHLORIDE, POTASSIUM CHLORIDE, SODIUM LACTATE AND CALCIUM CHLORIDE: 600; 310; 30; 20 INJECTION, SOLUTION INTRAVENOUS at 16:24

## 2023-10-08 RX ADMIN — HYDROMORPHONE HYDROCHLORIDE 0.2 MG: 0.2 INJECTION, SOLUTION INTRAMUSCULAR; INTRAVENOUS; SUBCUTANEOUS at 09:22

## 2023-10-08 RX ADMIN — HYDRALAZINE HYDROCHLORIDE 2.5 MG: 20 INJECTION, SOLUTION INTRAMUSCULAR; INTRAVENOUS at 17:59

## 2023-10-08 RX ADMIN — HYDROMORPHONE HYDROCHLORIDE 0.2 MG: 0.2 INJECTION, SOLUTION INTRAMUSCULAR; INTRAVENOUS; SUBCUTANEOUS at 13:32

## 2023-10-08 RX ADMIN — FENTANYL CITRATE 50 MCG: 50 INJECTION INTRAMUSCULAR; INTRAVENOUS at 16:47

## 2023-10-08 RX ADMIN — HYDRALAZINE HYDROCHLORIDE 2.5 MG: 20 INJECTION, SOLUTION INTRAMUSCULAR; INTRAVENOUS at 17:49

## 2023-10-08 RX ADMIN — HYDROMORPHONE HYDROCHLORIDE 0.2 MG: 0.2 INJECTION, SOLUTION INTRAMUSCULAR; INTRAVENOUS; SUBCUTANEOUS at 19:04

## 2023-10-08 RX ADMIN — DEXAMETHASONE SODIUM PHOSPHATE 8 MG: 4 INJECTION, SOLUTION INTRA-ARTICULAR; INTRALESIONAL; INTRAMUSCULAR; INTRAVENOUS; SOFT TISSUE at 16:28

## 2023-10-08 RX ADMIN — PHENYLEPHRINE HYDROCHLORIDE 100 MCG: 10 INJECTION INTRAVENOUS at 16:43

## 2023-10-08 RX ADMIN — SODIUM CHLORIDE, POTASSIUM CHLORIDE, SODIUM LACTATE AND CALCIUM CHLORIDE: 600; 310; 30; 20 INJECTION, SOLUTION INTRAVENOUS at 16:56

## 2023-10-08 RX ADMIN — TIMOLOL MALEATE 1 DROP: 5 SOLUTION/ DROPS OPHTHALMIC at 09:14

## 2023-10-08 RX ADMIN — LIDOCAINE HYDROCHLORIDE 50 MG: 20 INJECTION, SOLUTION INFILTRATION; PERINEURAL at 16:28

## 2023-10-08 RX ADMIN — ONDANSETRON 4 MG: 2 INJECTION INTRAMUSCULAR; INTRAVENOUS at 17:15

## 2023-10-08 RX ADMIN — FENTANYL CITRATE 100 MCG: 50 INJECTION INTRAMUSCULAR; INTRAVENOUS at 16:28

## 2023-10-08 RX ADMIN — SODIUM CHLORIDE, POTASSIUM CHLORIDE, SODIUM LACTATE AND CALCIUM CHLORIDE: 600; 310; 30; 20 INJECTION, SOLUTION INTRAVENOUS at 19:54

## 2023-10-08 RX ADMIN — HYDRALAZINE HYDROCHLORIDE 5 MG: 20 INJECTION, SOLUTION INTRAMUSCULAR; INTRAVENOUS at 18:20

## 2023-10-08 RX ADMIN — PHENYLEPHRINE HYDROCHLORIDE 100 MCG: 10 INJECTION INTRAVENOUS at 16:51

## 2023-10-08 ASSESSMENT — ACTIVITIES OF DAILY LIVING (ADL)
ADLS_ACUITY_SCORE: 32
ADLS_ACUITY_SCORE: 26
ADLS_ACUITY_SCORE: 32
ADLS_ACUITY_SCORE: 32
ADLS_ACUITY_SCORE: 26
ADLS_ACUITY_SCORE: 32

## 2023-10-08 NOTE — ANESTHESIA CARE TRANSFER NOTE
Patient: Maura Downing    Procedure: Procedure(s):  OPEN REDUCTION INTERNAL FIXATION, FRACTURE, PATELLA       Diagnosis: Closed displaced comminuted fracture of left patella, initial encounter [S82.042A]  Shoulder dislocation, left, initial encounter [S43.005A]  Diagnosis Additional Information: No value filed.    Anesthesia Type:   General     Note:    Oropharynx: oropharynx clear of all foreign objects and spontaneously breathing  Level of Consciousness: awake  Oxygen Supplementation: face mask  Level of Supplemental Oxygen (L/min / FiO2): 8  Independent Airway: airway patency satisfactory and stable  Dentition: dentition unchanged  Vital Signs Stable: post-procedure vital signs reviewed and stable  Report to RN Given: handoff report given  Patient transferred to: PACU  Comments: Pt to recovery.  Spontaneous respirations and exchanging well.  Pt making needs known.  Report given to RN.      Handoff Report: Identifed the Patient, Identified the Reponsible Provider, Reviewed the pertinent medical history, Discussed the surgical course, Reviewed Intra-OP anesthesia mangement and issues during anesthesia, Set expectations for post-procedure period and Allowed opportunity for questions and acknowledgement of understanding      Vitals:  Vitals Value Taken Time   /82    Temp 99.1  F (37.3  C) 10/08/23 1738   Pulse 65 10/08/23 1739   Resp 5 10/08/23 1739   SpO2 100 % 10/08/23 1739   Vitals shown include unvalidated device data.    Electronically Signed By: NARENDRA Solorzano CRNA  October 8, 2023  5:41 PM

## 2023-10-08 NOTE — PROGRESS NOTES
Notified provider about indwelling kendrick catheter discussed removal or continued need.    Did provider choose to remove indwelling kendrick catheter? No    Provider's kendrick indication for keeping indwelling kendrick catheter: Retention.    Is there an order for indwelling kendrick catheter? Yes    *If there is a plan to keep kendrick catheter in place at discharge daily notification with provider is not necessary, but please add a notation in the treatment team sticky note that the patient will be discharging with the catheter.       Place during Morning Shift for retention  Bladder Scan 510 after placement 550 out.

## 2023-10-08 NOTE — ANESTHESIA PROCEDURE NOTES
Airway       Patient location during procedure: OR  Staff -        CRNA: Tori Silverman APRN CRNA       Performed By: CRNA  Consent for Airway        Urgency: elective  Indications and Patient Condition       Indications for airway management: gunjan-procedural       Induction type:intravenous       Mask difficulty assessment: 0 - not attempted    Final Airway Details       Final airway type: supraglottic airway    Supraglottic Airway Details        Type: LMA       Brand: I-Gel       LMA size: 4    Post intubation assessment        Placement verified by: capnometry, equal breath sounds and chest rise        Number of attempts at approach: 1       Number of other approaches attempted: 0       Secured with: commercial tube gomez       Ease of procedure: easy       Dentition: Intact and Unchanged

## 2023-10-08 NOTE — PLAN OF CARE
Goal Outcome Evaluation:      Plan of Care Reviewed With: patient    Overall Patient Progress: no changeOverall Patient Progress: no change       Patient vital signs are at baseline: No,  Reason:  BP elevated  Patient able to ambulate as they were prior to admission or with assist devices provided by therapies during their stay:  No,  Reason:  bedrest  Patient MUST void prior to discharge:  Yes  Patient able to tolerate oral intake:  Yes  Pain has adequate pain control using Oral analgesics:  No,  Reason:  IV dilaudid  Does patient have an identified :  Yes  Has goal D/C date and time been discussed with patient:  No,  Reason:  surgery tomorrow    Pt A&O. CMS intact. LUE splinted. On room air. NPO @ MN.

## 2023-10-08 NOTE — PROGRESS NOTES
[facility]    Hospitalist Progress Note  Name: Maura Downing    MRN: [id number]  Provider:  David Ugarte DO  Date of Service: 10/08/2023    Summary of Stay: Maura Downing is a 68 year old female with a history of recurrent falls since her covid infection 2 years ago and ambulates with a walker, glaucoma, systolic murmur admitted on 10/7/2023 after a mechanical fall while at [employer]. in the emergency department, the patient was found to have a temperature of 97.4  F, heart rate 52, respiratory rate 20, blood pressure 195/95, SPO2 90% on room air.  Initial lab work showed BUN/creatinine 17.4/1.0, glucose 124, CBC within normal limits.  X-ray of the left forearm revealed an acute distal radial metaphyseal fracture with mild impaction and dorsal displacement and intra-articular extension with subtle linear lucency in the distal ulna on oblique view concerning for acute nondisplaced fracture.  Left shoulder and humeral x-ray showed mildly impacted and displaced left proximal humerus fracture centered at the humeral neck with fracture line extension likely into the greater tuberosity.  Left tibia and fibular and knee x-rays showed comminuted distracted left patellar fracture with left knee lipohemarthrosis.  CT left shoulder showed acute proximal left humerus impaction fracture with comminution with large glenohumeral joint lipohemarthrosis involving the subdeltoid bursa, chronic appearing T5 vertebral body compression fracture.  CT of the left tibia and fibula showed acute comminuted and displaced patellar fracture, lateral tibial plateau impaction fracture posteriorly, nondisplaced fracture line extending to the medial tibial plateau and tibial tuberosity, large lipohemarthrosis.  The patient was made n.p.o. and orthopedic surgery was consulted to see the patient    TODAY'S PLAN:  Appreciate Orthopedic Surgery recommendations.  Plan for OR this afternoon to address patellar fx.  Pain control per  ortho.  Resume PTA home meds (eye drops).  Plan for echocardiogram today for murmur on exam.  Overall, pt low risk for cardiac event during surgery.  At baseline, uses a walker.  Also, pt bladder scan overnight for 450 mL.  Will add post void residuals.  If need to straight cath, would just place kendrick as pt will be going for surgery this afternoon.    Problem List:   Mechanical Fall  Recurrent Falls  L Humeral Fracture with Dislocation of Humeral Head  L Radial Fracture  L Patellar Fracture  - Appreciate Orthopedic surgery recommendations - OR this afternoon  - NPO for OR  - IVF until surgery  - Pain control per ortho  - PT/OT after OR    Systolic Murmur  - Echocardiogram pending    Glaucoma  - Resume PTA latanoprost and timolol eye drops    Chronic T5 Compression Fracture  - As seen on CT L shoulder in the ED    I spent 46 minutes in reviewing this patient's labs, imaging, medications, medical history.  In addition time was spent interviewing the patient, communicating with family, and medical decision making.     DVT Prophylaxis: Pneumatic Compression Devices  Code Status: Full Code  Diet: NPO for Medical/Clinical Reasons Except for: Meds    Kendrick Catheter: Not present  Disposition: Expected discharge in 1-2 days to home vs TCU. Goals prior to discharge include ortho work up complete, PT/OT evaluation complete.   Family updated today: No     Interval History   Pt seen and examined.  Pt reports her shoulder is sore.  Denies cp or sob over the past few weeks.    -Data reviewed today: I personally reviewed all new labs and imaging results over the last 24 hours.     Physical Exam   Temp: 97.2  F (36.2  C) Temp src: Temporal BP: (!) 161/71 Pulse: 67   Resp: 16 SpO2: 97 % O2 Device: None (Room air) Oxygen Delivery: 5 LPM  Vitals:    10/07/23 1442 10/08/23 0614   Weight: 76 kg (167 lb 8 oz) 73.5 kg (162 lb 0.6 oz)     Vital Signs with Ranges  Temp:  [97.2  F (36.2  C)-97.4  F (36.3  C)] 97.2  F (36.2  C)  Pulse:   [51-68] 67  Resp:  [6-20] 16  BP: (143-222)/() 161/71  SpO2:  [94 %-100 %] 97 %  No intake/output data recorded.    GENERAL: No apparent distress. Awake, alert, and fully oriented.  HEENT: Normocephalic, atraumatic. Extraocular movements intact.  CARDIOVASCULAR: Regular rate and rhythm, 2/6 systolic murmur. No S3.  PULMONARY: Clear bilaterally.  GASTROINTESTINAL: Soft, non-tender, non-distended. Bowel sounds normoactive.   EXTREMITIES: No cyanosis or clubbing. No edema.  NEUROLOGICAL: CN 2-12 grossly intact, no focal neurological deficits.  DERMATOLOGICAL: No rash, ulcer, bruising, nor jaundice.    Medications    sodium chloride 100 mL/hr at 10/08/23 0634      acetaminophen  975 mg Oral Q8H    latanoprost  1 drop Both Eyes Daily    timolol maleate  1 drop Both Eyes QAM     Data     Laboratory:  Recent Labs   Lab 10/08/23  0737 10/07/23  1145   WBC 11.9* 7.0   HGB 12.1 14.1   HCT 35.4 41.2   MCV 93 93    267     Recent Labs   Lab 10/07/23  1145      POTASSIUM 3.7   CHLORIDE 104   CO2 25   ANIONGAP 11   *   BUN 17.4   CR 1.00*   GFRESTIMATED 60*   SHARON 8.9     No results for input(s): CULT in the last 168 hours.    Imaging:  Recent Results (from the past 24 hour(s))   Clavicle XR, left    Narrative    EXAM: XR CLAVICLE LEFT 2 VIEWS  LOCATION: [facility]  DATE: 10/07/2023.    INDICATION: Fall. Pain.  COMPARISON: Shoulder and humerus radiographic exam today.      Impression    IMPRESSION: No acute displaced left clavicle fracture identified. No clavicle joint malalignment. Redemonstrated is a left proximal humerus fracture. Degenerative change left AC joint. Subacromial spur. Degenerative change visualized cervicothoracic   spine.     Humerus XR,  G/E 2 views, left    Narrative    EXAM: XR SHOULDER LEFT G/E 3 VIEWS, XR HUMERUS LEFT G/E 2 VIEWS  LOCATION: [facility]  DATE: 10/07/2023    INDICATION: Fall. Humerus pain.  COMPARISON: None.       Impression    IMPRESSION: Mildly impacted and displaced left proximal humerus fracture centered at the humeral neck. Fracture line extension likely into the greater tuberosity. No AC separation or glenohumeral joint dislocation. Moderate acromioclavicular joint   osteoarthritis. Mild glenohumeral joint space narrowing. Small lung volumes with left basilar atelectasis. Mid to distal left humerus appears intact. No left elbow joint malalignment. Subacromial spur.    NOTE: ABNORMAL REPORT    THE DICTATION ABOVE DESCRIBES AN ABNORMALITY FOR WHICH FOLLOW-UP IS NEEDED.      XR Shoulder Left G/E 3 Views    Narrative    EXAM: XR SHOULDER LEFT G/E 3 VIEWS, XR HUMERUS LEFT G/E 2 VIEWS  LOCATION: [facility]  DATE: 10/07/2023    INDICATION: Fall. Humerus pain.  COMPARISON: None.      Impression    IMPRESSION: Mildly impacted and displaced left proximal humerus fracture centered at the humeral neck. Fracture line extension likely into the greater tuberosity. No AC separation or glenohumeral joint dislocation. Moderate acromioclavicular joint   osteoarthritis. Mild glenohumeral joint space narrowing. Small lung volumes with left basilar atelectasis. Mid to distal left humerus appears intact. No left elbow joint malalignment. Subacromial spur.    NOTE: ABNORMAL REPORT    THE DICTATION ABOVE DESCRIBES AN ABNORMALITY FOR WHICH FOLLOW-UP IS NEEDED.      XR Tibia and Fibula Left 2 Views    Narrative    EXAM: XR TIBIA AND FIBULA LEFT 2 VIEWS  LOCATION: [facility]  DATE: 10/07/2023    INDICATION: Fall. Pain.  COMPARISON: None.      Impression    IMPRESSION: Anatomic alignment left tibia and fibula. Subtle linear lucencies in the proximal left tibial metaphysis could represent summation artifact or nondisplaced fracture. No definitive fibula fracture. No focal periosteal reaction. Diffuse bone   demineralization. Arterial calcification and soft tissue calcinosis. Mid to distal leg soft tissue  swelling.     XR Knee Left 1/2 Views    Narrative    EXAM: XR KNEE LEFT 1/2 VIEWS  LOCATION: [facility]  DATE: 10/07/2023    INDICATION: Fall, pain.  COMPARISON: None.      Impression    IMPRESSION: Comminuted distracted left patella fracture. Left knee lipohemarthrosis. No distal femur, proximal tibia or proximal fibula fracture. Mild degenerative change in the medial and patellofemoral compartments of the left knee. Arterial   calcification. Knee soft tissue swelling.    NOTE: ABNORMAL REPORT    THE DICTATION ABOVE DESCRIBES AN ABNORMALITY FOR WHICH FOLLOW-UP IS NEEDED.      CT Tibia Fibula Lower Leg Left wo Contrast    Narrative    EXAM: CT TIBIA FIBULA LOWER LEG LEFT WO CONTRAST  LOCATION: [facility]  DATE: 10/7/2023    INDICATION: left proximal knee pain  COMPARISON: Radiographs from earlier today  TECHNIQUE: Noncontrast. Axial, sagittal and coronal thin-section reconstruction. Dose reduction techniques were used.     FINDINGS:     BONES AND JOINTS:  -Acute transversely oriented fracture of the mid patella with posterior displacement of the distal fracture fragment. There is intra-articular extension and multiple small fracture fragments. Acute impaction fracture of the lateral tibial plateau   posteriorly with 6 mm articular surface depression. There is a nondisplaced fracture line in the proximal tibia anteriorly which extends to the medial tibial plateau and tibial tuberosity. Mild cartilage thinning in the medial compartment. Osteopenia.   Large lipohemarthrosis. No intra-articular body.    SOFT TISSUES:  -Mild soft tissue swelling anteriorly. No discrete fluid collection. Semimembranosus fatty muscle atrophy. Atherosclerosis.      Impression    IMPRESSION:  1.  Acute comminuted and displaced patellar fracture.  2.  Lateral tibial plateau impaction fracture posteriorly.  3.  Nondisplaced fracture line extends to the medial tibial plateau and tibial tuberosity.    4.  Large lipohemarthrosis.  5.  Other ancillary findings as described above.     Radius/Ulna XR,  PA &LAT, left    Narrative    EXAM: XR FOREARM LEFT 2 VIEWS  LOCATION: [facility]  DATE: 10/7/2023    INDICATION: pain, fall  COMPARISON: None.      Impression    IMPRESSION: Acute distal radial metaphysis fracture with mild impaction and dorsal displacement. There is intra-articular extension. Subtle linear lucency in the distal ulna on oblique view is concerning for an acute nondisplaced fracture. Surrounding   soft tissue swelling. There is normal joint alignment. Osteopenia. Vascular calcification.   XR Shoulder Left 2 Views    Narrative    EXAM: XR SHOULDER LEFT 2 VIEWS  LOCATION: [facility]  DATE: 10/7/2023    INDICATION: ?dislocation s p attempt at reduction  COMPARISON: Earlier today      Impression    IMPRESSION: Proximal humerus fracture with similar displacement and impaction. There is mild inferior subluxation of the humeral head relative to the glenoid. Otherwise unchanged.   CT Shoulder Left w/o Contrast    Narrative    EXAM: CT SHOULDER LEFT W/O CONTRAST  LOCATION: [facility]  DATE: 10/7/2023    INDICATION: pain, fracture, further evaluation location  COMPARISON: Radiographs from earlier today  TECHNIQUE: Noncontrast. Axial, sagittal and coronal thin-section reconstruction. Dose reduction techniques were used.     FINDINGS:     BONES AND JOINTS:  -Acute fracture of the proximal humerus at the surgical neck with approximately 1 cm impaction. There is comminution with minimally displaced fracture fragments. Fracture lines extend into the greater tuberosity. There is normal joint alignment. Moderate   acromioclavicular joint degenerative changes. Chronic appearing T5 vertebral body compression fracture with 60% height loss and sclerosis. Degenerative changes of the visualized spine. Osteopenia. Large glenohumeral joint lipohemarthrosis  which involves   the subdeltoid bursa.    SOFT TISSUES:  -Mild soft tissue swelling around the fracture. No discrete fluid collection. The musculature appears within normal limits. Atherosclerosis. Moderate acromioclavicular joint degenerative changes.      Impression    IMPRESSION:  1.  Acute proximal left humerus impaction fracture with comminution.  2.  There is normal joint alignment.  3.  Large glenohumeral joint lipohemarthrosis which involves the subdeltoid bursa.  4.  Chronic appearing T5 vertebral body compression fracture.  5.  Other ancillary findings as described above.           David Ugarte DO  [facility] Hospitalist  [address] [place] [place] [address]  Securely message with [employer] (more info)  Text page via [facility] Paging/Directory   10/08/2023

## 2023-10-08 NOTE — PLAN OF CARE
Goal Outcome Evaluation:    Patient vital signs are at baseline: Yes  Patient able to ambulate as they were prior to admission or with assist devices provided by therapies during their stay:  No,  Reason:  Bedrest  Patient MUST void prior to discharge:  No,  Reason:  due to void  Patient able to tolerate oral intake:  No Pt is NPO  Pain has adequate pain control using Oral analgesics:  Yes  Does patient have an identified :  Yes  Has goal D/C date and time been discussed with patient:  Yes       Patient is a NPO and bedrest, pure wick in place for the night but she did not void, bladder scan showed volume of 456 this [place]. MD was notified to place an order to [facility] patient.

## 2023-10-08 NOTE — CONSULTS
[facility]  Orthopaedics/Foot and Ankle Surgery Consultation         Daniel Felton MD    Maura Downing MRN# [id number]   YOB: 1955 Age: 68 year old      Date of Admission:  10/7/2023  Date of Consult: 10/08/2023           Assessment and Plan:   68-year-old left-hand-dominant female, status post mechanical fall in [employer] yesterday, with a constellation of orthopedic injuries including an impacted, comminuted, and minimally displaced proximal humerus fracture, with suspected dislocation based on initial injury films and status post closed reduction of the left shoulder in the emergency department; left minimally displaced distal radius and ulnar fractures; left displaced patella fracture; and left impacted and nondisplaced lateral tibial plateau fracture.  I will review the patient's distal radius fracture with my hand surgery colleagues, but anticipate nonoperative management, particularly as we will be resting the left shoulder and allowing the fracture of the proximal humerus to heal with nonoperative management.  There is no operative intervention required for the tibial plateau fracture, though the patella fracture will require operative intervention given the significant displacement and disruption of the extensor mechanism.  The patient will proceed to the operating room today for ORIF of the left patella fracture.  The patient should maintain nonweightbearing precautions on the left upper and lower extremities and further postoperative recommendations with regard to the patient's left knee care will follow the patient's case today.  Hospitalist comanagement is appreciated.  The orthopedic trauma team will continue to follow the patient's care while she remains in house.  I would anticipate a TCU stay will be required given the patient's limited use of her left upper and lower extremities for an extended period of time.            Code Status:   Full Code         Primary  Care Physician:   No Ref-Primary, Physician None         Requesting Physician:      Hospitalist team.         Chief Complaint:   Left upper and lower extremity fractures.    History is obtained from the patient and medical chart.         History of Present Illness:   Maura Downing is a 68 year old left-hand-dominant female who sustained a mechanical fall when she lost her balance and [employer] yesterday.  The patient fell onto her left knee and outstretched left arm.  The patient was unable to bear weight immediately following the injury, and presented to the emergency department for further evaluation.  Radiographs obtained in the emergency department demonstrated a constellation of injuries including a suspected left proximal humerus fracture dislocation, minimally displaced distal radius and ulnar fractures, nondisplaced and impacted lateral tibial plateau fracture, and left patella fracture.  The patient was admitted to the hospitalist service and orthopedics consultation was obtained for management of her multiple orthopedic injuries.  The patient denies any prior history of significant left arm or leg injuries.  The patient denies associated burning, numbness, or tingling distally throughout the left arm or leg since her injury.  The patient will utilize a walker for assistance with balance when ambulating outside of the house at baseline.           Past Medical History:     Patient Active Problem List   Diagnosis    Closed displaced comminuted fracture of left patella, initial encounter    Shoulder dislocation, left, initial encounter    Closed fracture of distal end of left radius, unspecified fracture morphology, initial encounter    Other closed displaced fracture of proximal end of left humerus, initial encounter    Closed fracture of proximal end of left tibia, unspecified fracture morphology, initial encounter      History reviewed. No pertinent past medical history.          Past Surgical History:    History reviewed. No pertinent surgical history.         Home Medications:     Prior to Admission medications    Medication Sig Last Dose Taking? Auth Provider Long Term End Date   latanoprost (XALATAN) 0.005 % ophthalmic solution Place 1 drop into both eyes daily 10/6/2023 at PM Yes Unknown, Entered By History Yes    timolol maleate (TIMOPTIC) 0.5 % ophthalmic solution Place 1 drop into both eyes every morning 10/7/2023 at AM Yes Unknown, Entered By History              Current Medications:          [Auto Hold] acetaminophen  975 mg Oral Q8H    ceFAZolin  2 g Intravenous Pre-Op/Pre-procedure x 1 dose    ceFAZolin  2 g Intravenous See Admin Instructions    [Auto Hold] latanoprost  1 drop Both Eyes QPM    sodium chloride (PF)  3 mL Intracatheter Q8H    [Auto Hold] timolol maleate  1 drop Both Eyes QAM     albuterol, albuterol, fentaNYL, fentaNYL, fentaNYL, fentaNYL, hydrALAZINE, hydrALAZINE, HYDROmorphone, HYDROmorphone, [Auto Hold] HYDROmorphone, HYDROmorphone, HYDROmorphone, labetalol, labetalol, lidocaine 4%, lidocaine (buffered or not buffered), [Auto Hold] melatonin, [Auto Hold] naloxone **OR** [Auto Hold] naloxone **OR** [Auto Hold] naloxone **OR** [Auto Hold] naloxone, ondansetron **OR** ondansetron, ondansetron **OR** ondansetron, [Auto Hold] ondansetron **OR** [Auto Hold] ondansetron, [Auto Hold] oxyCODONE, prochlorperazine, prochlorperazine, racEPINEPHrine, racEPINEPHrine, sodium chloride (PF)         Allergies:     Allergies   Allergen Reactions    Chicken-Derived Products (Egg) Nausea and Vomiting    Codeine     Milk (Cow) Nausea            Social History:     Social History     Tobacco Use    Smoking status: Never    Smokeless tobacco: Never   Substance Use Topics    Alcohol use: Not on file             Family History:   History reviewed. No pertinent family history.           Review of Systems:   The 10 point Review of Systems is negative other than noted in the HPI            Physical Exam:   Blood  pressure (!) 198/88, pulse 65, temperature 98.1  F (36.7  C), temperature source Temporal, resp. rate 16, weight 73.5 kg (162 lb 0.6 oz), SpO2 100 %.  162 lbs .61 oz    Constitutional:   Awake, alert, cooperative, no apparent distress, and appears stated age.     Lungs:   No increased work of breathing, good air exchange.     Musculoskeletal:   The left upper extremity is immobilized in a sugar-tong splint.  Mild soft tissue swelling is present diffusely around the left shoulder without overlying abrasions or significant ecchymosis.  There is mild irritability with palpation diffusely around the proximal humerus.  The patient demonstrates intact thumbs up, A-OK, finger crossing, and finger spreading with full strength.  Sensation is grossly intact in superficial radial, median, ulnar, and axillary nerve distributions.  Fingers are warm and well-perfused with brisk capillary refill.    Moderate soft tissue swelling is present diffusely around the left knee, with minimal ecchymosis and no significant abrasions or overlying skin injury.  Manipulation of the left knee is deferred in light of the patient's known fractures.  The patient demonstrates intact dorsiflexion and plantarflexion of the ankle with preserved strength.  Sensation is fully intact in all peripheral nerve distributions distally throughout the left lower extremity.  Toes are warm and well-perfused with a palpable dorsalis pedis pulse.              Data:   All new lab and imaging data was reviewed.  Radiographs and a CT scan of the left shoulder obtained in the emergency department yesterday were personally reviewed without limitation and demonstrate what appear to be a fracture dislocation of the glenohumeral joint, with an impacted proximal humerus fracture.  The CT scan obtained after attempted closed reduction in the emergency department demonstrates a reduced glenohumeral joint.  There is evidence of minimally displaced distal radius and ulnar  fractures.  Radiographs of the left knee and a CT scan of the tibia and fibula demonstrate a displaced and slightly comminuted patella fracture, as well as a nondisplaced and impacted posterior lateral tibial plafond fracture.  Results for orders placed or performed during the hospital encounter of 10/07/23 (from the past 24 hour(s))   CT Shoulder Left w/o Contrast    Narrative    EXAM: CT SHOULDER LEFT W/O CONTRAST  LOCATION: [facility]  DATE: 10/7/2023    INDICATION: pain, fracture, further evaluation location  COMPARISON: Radiographs from earlier today  TECHNIQUE: Noncontrast. Axial, sagittal and coronal thin-section reconstruction. Dose reduction techniques were used.     FINDINGS:     BONES AND JOINTS:  -Acute fracture of the proximal humerus at the surgical neck with approximately 1 cm impaction. There is comminution with minimally displaced fracture fragments. Fracture lines extend into the greater tuberosity. There is normal joint alignment. Moderate   acromioclavicular joint degenerative changes. Chronic appearing T5 vertebral body compression fracture with 60% height loss and sclerosis. Degenerative changes of the visualized spine. Osteopenia. Large glenohumeral joint lipohemarthrosis which involves   the subdeltoid bursa.    SOFT TISSUES:  -Mild soft tissue swelling around the fracture. No discrete fluid collection. The musculature appears within normal limits. Atherosclerosis. Moderate acromioclavicular joint degenerative changes.      Impression    IMPRESSION:  1.  Acute proximal left humerus impaction fracture with comminution.  2.  There is normal joint alignment.  3.  Large glenohumeral joint lipohemarthrosis which involves the subdeltoid bursa.  4.  Chronic appearing T5 vertebral body compression fracture.  5.  Other ancillary findings as described above.     ABO/Rh type and screen    Narrative    The following orders were created for panel order ABO/Rh type and  screen.  Procedure                               Abnormality         Status                     ---------                               -----------         ------                     Adult Type and Screen[[id number]]                            Final result                 Please view results for these tests on the individual orders.   Basic metabolic panel   Result Value Ref Range    Sodium 141 135 - 145 mmol/L    Potassium 4.0 3.4 - 5.3 mmol/L    Chloride 108 (H) 98 - 107 mmol/L    Carbon Dioxide (CO2) 22 22 - 29 mmol/L    Anion Gap 11 7 - 15 mmol/L    Urea Nitrogen 22.0 8.0 - 23.0 mg/dL    Creatinine 1.07 (H) 0.51 - 0.95 mg/dL    GFR Estimate 56 (L) >60 mL/min/1.73m2    Calcium 8.7 (L) 8.8 - 10.2 mg/dL    Glucose 166 (H) 70 - 99 mg/dL   CBC with platelets   Result Value Ref Range    WBC Count 11.9 (H) 4.0 - 11.0 10e3/uL    RBC Count 3.81 3.80 - 5.20 10e6/uL    Hemoglobin 12.1 11.7 - 15.7 g/dL    Hematocrit 35.4 35.0 - 47.0 %    MCV 93 78 - 100 fL    MCH 31.8 26.5 - 33.0 pg    MCHC 34.2 31.5 - 36.5 g/dL    RDW 13.0 10.0 - 15.0 %    Platelet Count 229 150 - 450 10e3/uL   Adult Type and Screen   Result Value Ref Range    ABO/RH(D) A POS     Antibody Screen Negative Negative    SPECIMEN EXPIRATION DATE [id number]    ABO and Rh   Result Value Ref Range    ABO/RH(D) A POS     SPECIMEN EXPIRATION DATE [id number]    Echocardiogram Complete   Result Value Ref Range    LVEF  60-65%     [facility]    [id number]  [id number]  [id number]  [id number]^VIVEK     [facility]  Echocardiography Laboratory  [address] [place] [address]     Name: RODRIGUEZ AGUILERA  MRN: [id number]  : 1955  Study Date: 10/08/2023 12:30 PM  Age: 68 yrs  Gender: Female  Patient Location: [facility]  Reason For Study: Murmur  Ordering Physician: VIVEK SAXENA  Performed By: Donnell Cole Dr     BSA: 1.8 m2  Height: 64 in  Weight: 162 lb  HR: 58  BP: 180/85  mmHg  ______________________________________________________________________________  Procedure  Complete Portable Echo Adult. Optison (NDC #[id number]) given intravenously.  Technically difficult study.  ______________________________________________________________________________  Interpretation Summary     Left ventricular systolic function is normal.  The visual ejection fraction is 60-65%.  The left ventricle is normal in size.  There is moderate trileaflet aortic sclerosis.  The left atrium is mild to moderately dilated.     There are no old studies for comparison  ______________________________________________________________________________  Left Ventricle  The left ventricle is normal in size. There is normal left ventricular wall  thickness. Left ventricular systolic function is normal. The visual ejection  fraction is 60-65%. Grade I or early diastolic dysfunction. Normal left  ventricular wall motion. There is no thrombus seen in the left ventricle.     Right Ventricle  The right ventricle is normal in structure, function and size. There is no  mass or thrombus in the right ventricle.     Atria  The left atrium is mild to moderately dilated. Right atrial size is normal.  There is no atrial shunt seen. The left atrial appendage is not well  visualized.     Mitral Valve  Calcified mitral apparatus. There is no mitral regurgitation noted. There is  no mitral valve stenosis.     Tricuspid Valve  Normal tricuspid valve. There is mild (1+) tricuspid regurgitation. The right  ventricular systolic pressure is approximated at 29.2 mmHg plus the right  atrial pressure. There is no tricuspid stenosis.     Aortic Valve  There is moderate trileaflet aortic sclerosis. The mean AoV pressure gradient  is 11.0 mmHg.     Pulmonic Valve  Normal pulmonic valve. There is no pulmonic valvular regurgitation. There is  no pulmonic valvular stenosis.     Vessels  The aortic root is normal size. Normal size ascending aorta. The  inferior vena  cava is normal. The pulmonary artery is normal size.     Pericardium  The pericardium appears normal. There is no pleural effusion.     Rhythm  Sinus rhythm was noted.  ______________________________________________________________________________  MMode/2D Measurements & Calculations     Ao root diam: 3.1 cm  asc Aorta Diam: 3.7 cm  LVOT diam: 2.1 cm  LVOT area: 3.5 cm2  Ao root diam index Ht(cm/m): 1.9  Ao root diam index BSA (cm/m2): 1.7  Asc Ao diam index BSA (cm/m2): 2.1  Asc Ao diam index Ht(cm/m): 2.3  RV Base: 3.8 cm  TAPSE: 2.5 cm     Doppler Measurements & Calculations  MV E max eulogio: 101.0 cm/sec  MV A max eulogio: 121.0 cm/sec  MV E/A: 0.83  MV max P.3 mmHg  MV mean P.3 mmHg  MV V2 VTI: 45.1 cm  MVA(VTI): 2.5 cm2  MV dec time: 0.30 sec  Ao V2 max: 219.5 cm/sec  Ao max P.0 mmHg  Ao V2 mean: 149.0 cm/sec  Ao mean P.0 mmHg  Ao V2 VTI: 51.5 cm  BOBO(I,D): 2.2 cm2  BOBO(V,D): 2.2 cm2  LV V1 max P.2 mmHg  LV V1 max: 134.0 cm/sec  LV V1 VTI: 31.4 cm     SV(LVOT): 111.2 ml  SI(LVOT): 62.2 ml/m2  PA V2 max: 94.5 cm/sec  PA max PG: 3.6 mmHg  PA acc time: 0.14 sec  TR max eulogio: 270.0 cm/sec  TR max P.2 mmHg  AV Eulogio Ratio (DI): 0.61  BOBO Index (cm2/m2): 1.2  E/E' av.1  Lateral E/e': 8.1  Medial E/e': 16.0  RV S Eulogio: 13.7 cm/sec     ______________________________________________________________________________  Report approved by: Dr. Blaine Perry 10/08/2023 02:25 PM

## 2023-10-08 NOTE — ANESTHESIA PREPROCEDURE EVALUATION
Anesthesia Pre-Procedure Evaluation    Patient: Maura Downing   MRN: [id number] : 1955        Procedure : Procedure(s):  OPEN REDUCTION INTERNAL FIXATION, FRACTURE, PATELLA          History reviewed. No pertinent past medical history.   History reviewed. No pertinent surgical history.   Allergies   Allergen Reactions    Chicken-Derived Products (Egg) Nausea and Vomiting    Codeine     Milk (Cow) Nausea      Social History     Tobacco Use    Smoking status: Never    Smokeless tobacco: Never   Substance Use Topics    Alcohol use: Not on file      Wt Readings from Last 1 Encounters:   10/08/23 73.5 kg (162 lb 0.6 oz)        Anesthesia Evaluation            ROS/MED HX  ENT/Pulmonary:  - neg pulmonary ROS     Neurologic:       Cardiovascular: Comment: Ambulates with walker, frequent falls    Echo performed on this admission for systolic murmur  Interpretation Summary     Left ventricular systolic function is normal.  The visual ejection fraction is 60-65%.  The left ventricle is normal in size.  There is moderate trileaflet aortic sclerosis.  The left atrium is mild to moderately dilated.     There are no old studies for comparison        METS/Exercise Tolerance:     Hematologic:       Musculoskeletal:   (+)     fracture,          GI/Hepatic:       Renal/Genitourinary:       Endo:       Psychiatric/Substance Use:       Infectious Disease:       Malignancy:       Other:            Physical Exam    Airway        Mallampati: II   TM distance: > 3 FB   Neck ROM: full   Mouth opening: > 3 cm    Respiratory Devices and Support         Dental           Cardiovascular   cardiovascular exam normal          Pulmonary   pulmonary exam normal                OUTSIDE LABS:  CBC:   Lab Results   Component Value Date    WBC 11.9 (H) 10/08/2023    WBC 7.0 10/07/2023    HGB 12.1 10/08/2023    HGB 14.1 10/07/2023    HCT 35.4 10/08/2023    HCT 41.2 10/07/2023     10/08/2023     10/07/2023     BMP:   Lab Results    Component Value Date     10/08/2023     10/07/2023    POTASSIUM 4.0 10/08/2023    POTASSIUM 3.7 10/07/2023    CHLORIDE 108 (H) 10/08/2023    CHLORIDE 104 10/07/2023    CO2 22 10/08/2023    CO2 25 10/07/2023    BUN 22.0 10/08/2023    BUN 17.4 10/07/2023    CR 1.07 (H) 10/08/2023    CR 1.00 (H) 10/07/2023     (H) 10/08/2023     (H) 10/07/2023     COAGS: No results found for: PTT, INR, FIBR  POC: No results found for: BGM, HCG, HCGS  HEPATIC: No results found for: ALBUMIN, PROTTOTAL, ALT, AST, GGT, ALKPHOS, BILITOTAL, BILIDIRECT, SHARMILA  OTHER:   Lab Results   Component Value Date    SHARON 8.7 (L) 10/08/2023       Anesthesia Plan    ASA Status:  2    NPO Status:  NPO Appropriate    Anesthesia Type: General.     - Airway: LMA   Induction: Intravenous.   Maintenance: Balanced.        Consents    Anesthesia Plan(s) and associated risks, benefits, and realistic alternatives discussed. Questions answered and patient/representative(s) expressed understanding.     - Discussed:     - Discussed with:  Patient            Postoperative Care    Pain management: IV analgesics, Oral pain medications, Multi-modal analgesia.   PONV prophylaxis: Ondansetron (or other 5HT-3), Dexamethasone or Solumedrol     Comments:                Sarita Benavides MD

## 2023-10-08 NOTE — PLAN OF CARE
Goal Outcome Evaluation:      Plan of Care Reviewed With: patient    Overall Patient Progress: no changeOverall Patient Progress: no change    Outcome Evaluation: surgery today    Patient vital signs are at baseline: Yes  Patient able to ambulate as they were prior to admission or with assist devices provided by therapies during their stay:  No,  Reason:  Bedrest   Patient MUST void prior to discharge:  No,  Reason:  Kendrick placed  Patient able to tolerate oral intake:  No,  Reason:  NPO  Pain has adequate pain control using Oral analgesics:  No,  Reason:  IV dilaudid   Does patient have an identified :  Yes  Has goal D/C date and time been discussed with patient:  No,  Reason:  Not at this time waiting for surgery which is scheduled for later today    Admitting Diagnosis: Left shoulder Dislocation, Left knee FX Date 10/7/23  PMH: Covid Hx  Orientation: A/Ox4     Vitals/Tele: Stable   Resp: LS Clear RA   Pain: 8/10 IV PRN IV dilaudid  IV Access/drains: PIV x 1 right arm  Diet:NPO for surgery  Labs/ Glucose: WBC 11.9 Hgb 12.1  Mobility:  Bedrest  GI/: Denies Nausea, vomiting, or abd pain. LBM 10/7/23 Voiding kendrick placed this morning due to retention  Wound/Skin: Cast to the left forearm blanchable redness to butt. Swollen left knee    Consults:Ortho   Summary/Discharge Plan:Patient to surgery this afternoon for  patellar FX.         Patient left for PACU at 1430 in Bed, report given to PACU nurse.     See Flow sheets for assessment

## 2023-10-08 NOTE — OP NOTE
PREOPERATIVE DIAGNOSIS: Left comminuted patella fracture.    POSTOPERATIVE DIAGNOSIS: Left comminuted patella fracture.    PROCEDURE(S): ORIF left comminuted patella fracture.    ATTENDING SURGEON: Daniel Felton MD.    ASSISTANT SURGEON: None.    ANESTHESIA: General with local anesthetic applied at the conclusion of the case.    EBL: 10 mL.    IMPLANTS: [employer] 2.8 mm Q-fix anchors x2.    COMPLICATIONS: None apparent.    INDICATIONS: Maura is a pleasant 68 year-old female who sustained a mechanical fall onto her left upper and lower extremities yesterday, sustaining a multitude of orthopedic injuries, including a displaced left patella fracture and nondisplaced and slightly impacted posterior lateral tibial plateau fracture.  Given the significant fracture displacement of the patella and lack of integrity of the extensor mechanism, operative intervention was recommended to allow for appropriate fracture healing, and preserve function of the left knee.  No surgical intervention is required for the left tibial plateau fracture.  After full discussion of the benefits and risks of surgery, the patient provided informed consent to proceed.    The patient was identified in the pre-operative holding area on the date of surgery.  The operative site was marked with indelible marker and the patient was brought back to the operating room and transferred to the operating table in a supine position.  All bony prominences were well-padded.  Anesthesia was administered without complication.  The left lower extremity was prepped and draped in standard sterile fashion.  A pre-operative timeout was performed identifying the correct patient, procedure, operative site, antibiotic administration, and equipment necessary for the procedure.    After Esmarch exsanguination, an upper thigh tourniquet was inflated.  A longitudinal incision was made over the anterior aspect of the knee.  Soft tissue dissection was carefully carried  down maintaining excellent hemostasis.  The patellar tendon peritenon and deep fascia overlying the patella were sharply incised.  There was a relatively transverse rupture of the periosteum over the patella, though the medial and lateral capsular attachments around the extensor mechanism remained intact.  The patella fracture was identified and small comminuted fragments of bone were carefully excised from around the fracture site.  The fracture site was opened up exposing the knee joint.  The knee joint was thoroughly irrigated, removing all loose debris and hematoma.  There was no evidence of other significant loose body or intra-articular pathology throughout the visualized portion of the knee.  Approximately 90% of the patella remained intact, without significant chondral injury on the undersurface of the patella.  The distal 10% of the patella was fairly comminuted and not amenable for any direct repair or hardware fixation.  Therefore, the bony fragments at the distal pole of the patella were carefully excised.  Fluoroscopic imaging at this point confirmed complete resection of the comminuted bone at the distal pole of the patella.  In addition, there was no evidence of instability of the knee with varus or valgus stress.  The lateral tibial plateau fracture remained nondisplaced, with excellent overall alignment of the articular surface along the tibial plateau.  Two [employer]hew 2.8 mm Q-fix anchors were then secured within the proximal pole of the patella.  The patellar tendon and associated joint capsule were imbricated and secured down to the anchors, providing a watertight closure around the knee joint.  Excellent stability of the repair was confirmed with gentle flexion of the knee to just over 60 degrees, without any evidence of gapping at the repair site.  The transverse rupture through the periosteum overlying the patella fracture site was repaired with a running 0 Vicryl suture.  The patellar  peritenon and deep fascia were reapproximated with a running 2-0 Vicryl suture.    The wound was copiously irrigated.  Subcutaneous tissues and skin were reapproximated with interrupted 3-0 Monocryl and staples respectively.  An Aquacel dressing was applied.  The patient was brought to the PACU in stable condition for further postoperative care.    Postoperatively, the patient will remain nonweightbearing on the left lower extremity.  The patient will be placed in a knee immobilizer at all times when upright, but may remove the knee immobilizer when at rest in bed.  The patient will continue nonweightbearing precautions for her left upper extremity.  The patient will be placed on aspirin for DVT prophylaxis postoperatively.  The patient will return to clinic for follow-up in 2 weeks for repeat evaluation including wound check, staple removal, and nonweightbearing radiographs of the left knee.  Further follow-up for the patient's left upper extremity  injuries will be established after further consultation with the hand surgery team.    Of note, all counts were correct at the conclusion of the case.

## 2023-10-09 ENCOUNTER — APPOINTMENT (OUTPATIENT)
Dept: PHYSICAL THERAPY | Facility: CLINIC | Age: 68
DRG: 516 | End: 2023-10-09
Attending: ORTHOPAEDIC SURGERY
Payer: MEDICARE

## 2023-10-09 ENCOUNTER — APPOINTMENT (OUTPATIENT)
Dept: OCCUPATIONAL THERAPY | Facility: CLINIC | Age: 68
DRG: 516 | End: 2023-10-09
Attending: ORTHOPAEDIC SURGERY
Payer: MEDICARE

## 2023-10-09 LAB
ANION GAP SERPL CALCULATED.3IONS-SCNC: 10 MMOL/L (ref 7–15)
BUN SERPL-MCNC: 20.1 MG/DL (ref 8–23)
CALCIUM SERPL-MCNC: 8.6 MG/DL (ref 8.8–10.2)
CHLORIDE SERPL-SCNC: 107 MMOL/L (ref 98–107)
CREAT SERPL-MCNC: 0.95 MG/DL (ref 0.51–0.95)
DEPRECATED HCO3 PLAS-SCNC: 22 MMOL/L (ref 22–29)
EGFRCR SERPLBLD CKD-EPI 2021: 64 ML/MIN/1.73M2
ERYTHROCYTE [DISTWIDTH] IN BLOOD BY AUTOMATED COUNT: 13.1 % (ref 10–15)
GLUCOSE SERPL-MCNC: 129 MG/DL (ref 70–99)
HCT VFR BLD AUTO: 32.2 % (ref 35–47)
HGB BLD-MCNC: 10.8 G/DL (ref 11.7–15.7)
MCH RBC QN AUTO: 32.2 PG (ref 26.5–33)
MCHC RBC AUTO-ENTMCNC: 33.5 G/DL (ref 31.5–36.5)
MCV RBC AUTO: 96 FL (ref 78–100)
PLATELET # BLD AUTO: 212 10E3/UL (ref 150–450)
POTASSIUM SERPL-SCNC: 4.3 MMOL/L (ref 3.4–5.3)
RBC # BLD AUTO: 3.35 10E6/UL (ref 3.8–5.2)
SODIUM SERPL-SCNC: 139 MMOL/L (ref 135–145)
WBC # BLD AUTO: 12.5 10E3/UL (ref 4–11)

## 2023-10-09 PROCEDURE — 250N000011 HC RX IP 250 OP 636: Performed by: ORTHOPAEDIC SURGERY

## 2023-10-09 PROCEDURE — 250N000013 HC RX MED GY IP 250 OP 250 PS 637: Performed by: INTERNAL MEDICINE

## 2023-10-09 PROCEDURE — 85027 COMPLETE CBC AUTOMATED: CPT | Performed by: ORTHOPAEDIC SURGERY

## 2023-10-09 PROCEDURE — 36415 COLL VENOUS BLD VENIPUNCTURE: CPT | Performed by: ORTHOPAEDIC SURGERY

## 2023-10-09 PROCEDURE — 97530 THERAPEUTIC ACTIVITIES: CPT | Mod: GP | Performed by: PHYSICAL THERAPIST

## 2023-10-09 PROCEDURE — 250N000013 HC RX MED GY IP 250 OP 250 PS 637: Performed by: ORTHOPAEDIC SURGERY

## 2023-10-09 PROCEDURE — 97165 OT EVAL LOW COMPLEX 30 MIN: CPT | Mod: GO

## 2023-10-09 PROCEDURE — 99232 SBSQ HOSP IP/OBS MODERATE 35: CPT | Performed by: INTERNAL MEDICINE

## 2023-10-09 PROCEDURE — 80048 BASIC METABOLIC PNL TOTAL CA: CPT | Performed by: ORTHOPAEDIC SURGERY

## 2023-10-09 PROCEDURE — 97530 THERAPEUTIC ACTIVITIES: CPT | Mod: GO

## 2023-10-09 PROCEDURE — 120N000001 HC R&B MED SURG/OB

## 2023-10-09 PROCEDURE — 250N000011 HC RX IP 250 OP 636: Mod: JZ | Performed by: ORTHOPAEDIC SURGERY

## 2023-10-09 PROCEDURE — 97161 PT EVAL LOW COMPLEX 20 MIN: CPT | Mod: GP | Performed by: PHYSICAL THERAPIST

## 2023-10-09 RX ORDER — AMOXICILLIN 250 MG
1 CAPSULE ORAL 2 TIMES DAILY PRN
Qty: 20 TABLET | DISCHARGE
Start: 2023-10-09

## 2023-10-09 RX ORDER — OXYCODONE HYDROCHLORIDE 5 MG/1
5-10 TABLET ORAL EVERY 4 HOURS PRN
Qty: 25 TABLET | Refills: 0 | Status: SHIPPED | OUTPATIENT
Start: 2023-10-09

## 2023-10-09 RX ORDER — ACETAMINOPHEN 325 MG/1
975 TABLET ORAL EVERY 8 HOURS
Qty: 30 TABLET | DISCHARGE
Start: 2023-10-09

## 2023-10-09 RX ORDER — OXYCODONE HYDROCHLORIDE 5 MG/1
5-10 TABLET ORAL EVERY 4 HOURS PRN
Status: DISCONTINUED | OUTPATIENT
Start: 2023-10-09 | End: 2023-10-12 | Stop reason: HOSPADM

## 2023-10-09 RX ORDER — ASPIRIN 325 MG
325 TABLET, DELAYED RELEASE (ENTERIC COATED) ORAL DAILY
Qty: 30 TABLET | DISCHARGE
Start: 2023-10-10

## 2023-10-09 RX ADMIN — ACETAMINOPHEN 975 MG: 325 TABLET, FILM COATED ORAL at 21:49

## 2023-10-09 RX ADMIN — CEFAZOLIN 1 G: 1 INJECTION, POWDER, FOR SOLUTION INTRAMUSCULAR; INTRAVENOUS at 08:20

## 2023-10-09 RX ADMIN — OXYCODONE HYDROCHLORIDE 5 MG: 5 TABLET ORAL at 05:29

## 2023-10-09 RX ADMIN — OXYCODONE HYDROCHLORIDE 10 MG: 5 TABLET ORAL at 18:57

## 2023-10-09 RX ADMIN — CEFAZOLIN 1 G: 1 INJECTION, POWDER, FOR SOLUTION INTRAMUSCULAR; INTRAVENOUS at 00:09

## 2023-10-09 RX ADMIN — SENNOSIDES AND DOCUSATE SODIUM 1 TABLET: 8.6; 5 TABLET ORAL at 08:20

## 2023-10-09 RX ADMIN — OXYCODONE HYDROCHLORIDE 10 MG: 5 TABLET ORAL at 23:25

## 2023-10-09 RX ADMIN — OXYCODONE HYDROCHLORIDE 5 MG: 5 TABLET ORAL at 10:12

## 2023-10-09 RX ADMIN — LATANOPROST 1 DROP: 50 SOLUTION OPHTHALMIC at 20:22

## 2023-10-09 RX ADMIN — HYDROMORPHONE HYDROCHLORIDE 0.2 MG: 0.2 INJECTION, SOLUTION INTRAMUSCULAR; INTRAVENOUS; SUBCUTANEOUS at 02:16

## 2023-10-09 RX ADMIN — TIMOLOL MALEATE 1 DROP: 5 SOLUTION/ DROPS OPHTHALMIC at 08:22

## 2023-10-09 RX ADMIN — HYDROXYZINE HYDROCHLORIDE 10 MG: 10 TABLET ORAL at 13:23

## 2023-10-09 RX ADMIN — OXYCODONE HYDROCHLORIDE 5 MG: 5 TABLET ORAL at 14:44

## 2023-10-09 RX ADMIN — HYDROMORPHONE HYDROCHLORIDE 0.2 MG: 0.2 INJECTION, SOLUTION INTRAMUSCULAR; INTRAVENOUS; SUBCUTANEOUS at 06:52

## 2023-10-09 RX ADMIN — SENNOSIDES AND DOCUSATE SODIUM 1 TABLET: 8.6; 5 TABLET ORAL at 20:22

## 2023-10-09 RX ADMIN — HYDROXYZINE HYDROCHLORIDE 10 MG: 10 TABLET ORAL at 05:29

## 2023-10-09 RX ADMIN — ASPIRIN 325 MG: 325 TABLET, COATED ORAL at 08:20

## 2023-10-09 ASSESSMENT — ACTIVITIES OF DAILY LIVING (ADL)
ADLS_ACUITY_SCORE: 32
ADLS_ACUITY_SCORE: 32
DEPENDENT_IADLS:: INDEPENDENT
ADLS_ACUITY_SCORE: 32

## 2023-10-09 NOTE — PROGRESS NOTES
10/09/23 1344   Appointment Info   Signing Clinician's Name / Credentials (PT) Maricarmen Dickson DPT   Living Environment   People in Home spouse   Current Living Arrangements house   Home Accessibility no concerns  (has ramp entry and can stay on main level)   Living Environment Comments Can walk in house without AD, in community, 4WW; IND with ADLs/IADLs except driving (spouse drives); has walk in shower on main level with grab bars, has chair in shower, comfort height toilet with grab bar; spouse able to physically assist, spouse doesn't use AD; reports main level home is NOT w/c accessible   Self-Care   Usual Activity Tolerance good   Current Activity Tolerance poor   Equipment Currently Used at Home grab bar, tub/shower;walker, rolling   Fall history within last six months yes   Number of times patient has fallen within last six months 3   General Information   Onset of Illness/Injury or Date of Surgery 10/07/23   Referring Physician Daniel Felton MD   Patient/Family Therapy Goals Statement (PT) to figure out how to move a bit   Pertinent History of Current Problem (include personal factors and/or comorbidities that impact the POC) 68 year old female with a history of recurrent falls since her covid infection 2 years ago and ambulates with a walker, glaucoma, systolic murmur admitted on 10/7/2023 after a mechanical fall while at [employer].  Found to have L Humeral Fracture with Dislocation of Humeral Head  L Radial Fracture  L Patellar Fracture   Weight-Bearing Status - LUE nonweight-bearing   Weight-Bearing Status - LLE nonweight-bearing   General Observations Supine, NAD, LUE in splint from wrist to upper arm   Cognition   Affect/Mental Status (Cognition) WFL   Orientation Status (Cognition) oriented x 4   Pain Assessment   Patient Currently in Pain   (with activity 8/10)   Integumentary/Edema   Integumentary/Edema Comments see RN assessment   Posture    Posture Forward head position;Protracted  shoulders   Range of Motion (ROM)   Range of Motion ROM deficits secondary to surgical procedure;ROM deficits secondary to pain   ROM Comment L knee in KI, L elbow splinted to 90degs   Strength (Manual Muscle Testing)   Strength (Manual Muscle Testing) Deficits observed during functional mobility   Bed Mobility   Bed Mobility scooting/bridging;supine-sit;sit-supine   Scooting/Bridging [place] (Bed Mobility) 2 person assist;dependent (less than 25% patient effort)   Supine-Sit [place] (Bed Mobility) moderate assist (50% patient effort);2 person assist   Sit-Supine [place] (Bed Mobility) moderate assist (50% patient effort);2 person assist   Transfers   Comment, (Transfers) unable to assess at this time d/t pain, nausea   Balance   Balance Comments Fair sitting balance   Sensory Examination   Sensory Perception patient reports no sensory changes   Clinical Impression   Criteria for Skilled Therapeutic Intervention Yes, treatment indicated   PT Diagnosis (PT) decreased functional mobility   Influenced by the following impairments pain, NWB status, decreased strength   Functional limitations due to impairments bed mobility, transfers, ambulation   Clinical Presentation (PT Evaluation Complexity) Evolving/Changing   Clinical Presentation Rationale clinical judgement   Clinical Decision Making (Complexity) moderate complexity   Planned Therapy Interventions (PT) balance training;bed mobility training;home exercise program;neuromuscular re-education;patient/family education;strengthening;transfer training;wheelchair management/propulsion training;risk factor education;home program guidelines;progressive activity/exercise   Anticipated Equipment Needs at Discharge (PT)   (TBD Replaced by [facility] facility)   Risk & Benefits of therapy have been explained evaluation/treatment results reviewed;care plan/treatment goals reviewed;risks/benefits reviewed;current/potential barriers reviewed;participants voiced agreement with care  plan;participants included;patient   PT Total Evaluation Time   PT Eval, Low Complexity Minutes (54517) 12   Physical Therapy Goals   PT Frequency Daily  (adjust to tolerance)   PT Predicted Duration/Target Date for Goal Attainment 10/16/23   PT Goals Bed Mobility;Transfers;Wheelchair Mobility   PT: Bed Mobility Minimal assist;Supine to/from sit;Within precautions   PT: Transfers Minimal assist;Sit to/from stand;Bed to/from chair;Assistive device;Within precautions   PT: Wheelchair Mobility 50 feet   PT Discharge Planning   PT Plan trial slideboard tx?   PT Discharge Recommendation (DC Rec) Transitional Care Facility   PT Rationale for DC Rec Pt significantly below baseline and very limited by NWB status to LUE and LLE.  Pt will need TCU at discharge to work on maximizing mobility and transfers within WB precautions   PT Brief overview of current status lateral tx OOB only at this time   Total Session Time   Timed Code Treatment Minutes 18   Total Session Time (sum of timed and untimed services) 30

## 2023-10-09 NOTE — CONSULTS
Care Management Initial Consult    General Information  Assessment completed with: Patient, Patient, Maura  Type of CM/SW Visit: Initial Assessment    Primary Care Provider verified and updated as needed:     Readmission within the last 30 days:        Reason for Consult: discharge planning  Advance Care Planning:       General Information Comments: Lives with her  independent prior to hospitalaization    Communication Assessment  Patient's communication style: spoken language (English or Bilingual)    Hearing Difficulty or Deaf: yes (slight [place])   Wear Glasses or Blind: yes    Cognitive  Cognitive/Neuro/Behavioral: WDL                      Living Environment:   People in home: spouse  Fatimah  Current living Arrangements: house      Able to return to prior arrangements: no  Living Ar[place]ement Comments[place] [facility]    Family/Social Support:  Care provided by: self  Provides care for:    Marital Status:   , Children  fatimah       Description of Support System: Involved, Supportive         Current Resources:   Patient receiving home care services: No     Community Resources: None  Equipment currently used at home: grab bar, tub/shower       Employment/Financial:  Employment Status: retired       Does the patient's insurance plan have a 3 day qualifying hospital stay waiver?  No    Lifestyle & Psychosocial Needs:  Social Determinants of Health     Food Insecurity: Not on file   Depression: Not on file   Housing Stability: Not on file   Tobacco Use: Low Risk  (10/8/2023)    Patient History     Smoking Tobacco Use: Never     Smokeless Tobacco Use: Never     Passive Exposure: Not on file   Financial Resource Strain: Not on file   Alcohol Use: Not on file   Transportation Needs: Not on file   Physical Activity: Not on file   Interpersonal Safety: Not on file   Stress: Not on file   Social Connections: Not on file       Functional Status:  Prior to admission patient  needed assistance:   Dependent ADLs:: Independent  Dependent IADLs:: Independent       Mental Health Status:  Mental Health Status: No Current Concerns       Chemical Dependency Status:  Chemical Dependency Status: No Current Concerns             Values/Beliefs:  Spiritual, Cultural Beliefs, Yarsanism Practices, Values that affect care:                 Additional Information:  Met with patient. She was alert and oriented x4, able to verbalize her thought and needs appropriately and is able to make her own decisions. She reports that she fell at [employer]. She know that she will need to go to TCU prior to returning home with her . She sustained injuries to her left shoulder, arm wrist, and leg. She is left handed so currently she is able to do much for herself.    She lives in the [facility] just outside of [place], [place]. She would like to go some where close to her home so her  can visit daily.    The only referral that patient and family wanted me to send was to [facility] living in [place], [place]. They know the admission  there. Waiting for a call back from them.    MIKA will continue to monitor and assist with disposition.    ARUN Gilbert   Inpatient Care Coordination   Supervisor  [facility]  [phone]        ARUN Horan

## 2023-10-09 NOTE — PROGRESS NOTES
Orthopedic Surgery  Maura Downing  10/9/2023  Admit Date:  10/7/2023  POD 1 Day Post-Op  S/P Procedure(s):  OPEN REDUCTION INTERNAL FIXATION, FRACTURE, PATELLA    Patient is feeling okay but tired.  Pain controlled.  Tolerating oral intake.  No events overnight. Discussed pain and recovery expectations with patient.  She plans on a tcu.      Alert and orient to person, place, and time, resting in bed.  Vital Sign Ranges  Temperature Temp  Av.8  F (36.6  C)  Min: 96.8  F (36  C)  Max: 99.1  F (37.3  C)   Blood pressure Systolic (24hrs), Av , Min:123 , Max:205        Diastolic (24hrs), Av, Min:59, Max:128      Pulse Pulse  Av.5  Min: 64  Max: 79   Respirations Resp  Av  Min: 5  Max: 20   Pulse oximetry SpO2  Av.5 %  Min: 91 %  Max: 100 %       left knee aquacel is CDI with scant drainage. Minimal erythema of the surrounding skin-KI in place without areas of excessive skin rub..  Left upper extremity splint in place and well fitted. No areas of skin irritation or excessive rub.  Distal lue digits NVI  Bilateral calves are soft, non-tender.  Left lower extremity is NVI.    Labs:  Recent Labs   Lab Test 10/09/23  0549 10/08/23  0737 10/07/23  1145   POTASSIUM 4.3 4.0 3.7     Recent Labs   Lab Test 10/09/23  0549 10/08/23  0737 10/07/23  1145   HGB 10.8* 12.1 14.1     No results for input(s): INR in the last 73274 hours.  Recent Labs   Lab Test 10/09/23  0549 10/08/23  0737 10/07/23  1145    229 267       A/P  1. S/p ORIF left patella  2. Left impacted and nondisplaced lateral tibial plateau fracture   3. impacted, comminuted, and minimally displaced proximal humerus fracture   4. Status post closed reduction of the left shoulder in the emergency department    Continue aspirin for DVT prophylaxis.     Mobilize with PT/OT NWB to BOTH left lower extremity and left upper extremity.  Knee immobilizer at all times when upright, but may remove the knee immobilizer when at rest in bed.  Assess  daily for areas of skin irritation or wear.   ICE to left knee, leave Aquacel undisturbed  Continue left upper extremity splint and ice to left shoulder prn     Continue current pain regimen.    2. Disposition   Anticipate d/c to TCU pending progress.    Kjerstin L Foss, PA-C

## 2023-10-09 NOTE — PROGRESS NOTES
[facility]    Medicine Progress Note - Hospitalist Service    Date of Admission:  10/7/2023    Assessment & Plan      Summary of Stay: Maura Downing is a 68 year old female with a history of recurrent falls since her covid infection 2 years ago and ambulates with a walker, glaucoma, systolic murmur admitted on 10/7/2023 after a mechanical fall while at [employer]. in the emergency department, the patient was found to have a temperature of 97.4  F, heart rate 52, respiratory rate 20, blood pressure 195/95, SPO2 90% on room air.  Initial lab work showed BUN/creatinine 17.4/1.0, glucose 124, CBC within normal limits.  X-ray of the left forearm revealed an acute distal radial metaphyseal fracture with mild impaction and dorsal displacement and intra-articular extension with subtle linear lucency in the distal ulna on oblique view concerning for acute nondisplaced fracture.  Left shoulder and humeral x-ray showed mildly impacted and displaced left proximal humerus fracture centered at the humeral neck with fracture line extension likely into the greater tuberosity.  Left tibia and fibular and knee x-rays showed comminuted distracted left patellar fracture with left knee lipohemarthrosis.  CT left shoulder showed acute proximal left humerus impaction fracture with comminution with large glenohumeral joint lipohemarthrosis involving the subdeltoid bursa, chronic appearing T5 vertebral body compression fracture.  CT of the left tibia and fibula showed acute comminuted and displaced patellar fracture, lateral tibial plateau impaction fracture posteriorly, nondisplaced fracture line extending to the medial tibial plateau and tibial tuberosity, large lipohemarthrosis.         TODAY'S PLAN:    Appreciate Orthopedic Surgery recommendations.   -Tolerated ORIF of the left comminuted patellar fracture   -Defer to orthopedic service regarding instructions for activities and weightbearing  -Working with PT, OT  services  -Anticipating will be needing TCU placement  -Echocardiogram appears to be reassuring with normal LVEF and LV size        Problem List:   Mechanical Fall  Recurrent Falls  L Humeral Fracture with Dislocation of Humeral Head  L Radial Fracture  L Patellar Fracture  - Appreciate Orthopedic surgery recommendations   - Pain control per ortho  - PT/OT  -Social service input     Systolic Murmur  -Reassuring echocardiogram with trileaflet aortic sclerosis with preserved EF     Glaucoma  - Resume PTA latanoprost and timolol eye drops     Chronic T5 Compression Fracture  - As seen on CT L shoulder in the ED          Diet: Advance Diet as Tolerated: Regular Diet Adult    DVT Prophylaxis: Aspirin 3 2 5 mg daily as per orthopedic service  De Anda Catheter: Not present  Lines: None     Cardiac Monitoring: None  Code Status: Full Code      Clinically Significant Risk Factors                                 Disposition Plan      Expected Discharge Date: 1 to 2 days  Likely will need TCU approach                  Abdifatah Oliva MD, MD  Hospitalist Service  [facility]  Securely message with [employer] (more info)  Text page via [employer] Paging/Directory   ______________________________________________________________________    Interval History   I assume medicine service care today.  Seen and examined.  Chart reviewed.  Most recent orthopedics notes and plans reviewed  Tolerated procedure earlier  She is feeling tired but pain is under reasonable control.  No significant reports event overnight.  Showing increasing blood pressure levels.  Not tachycardic, not hypoxic and afebrile.  No reports of nausea or vomiting either.    Physical Exam   Vital Signs: Temp: 98  F (36.7  C) Temp src: Temporal BP: (!) 164/71 Pulse: 62   Resp: 16 SpO2: 97 % O2 Device: None (Room air)    Weight: 162 lbs .61 oz    HEENT; Atraumatic, normocephalic, pinkish conjuctiva, pupils bilateral reactive   Skin: warm and moist, no  rashes  Lungs: equal chest expansion, clear to auscultation, no wheezes, no stridor, no crackles,   Heart: normal rate, normal rhythm, no rubs or gallops.   Abdomen: normal bowel sounds, no tenderness, no peritoneal signs, no guarding  Extremities: no deformities, no edema   Neuro; follow commands, alert and oriented x3, spontaneous speech, coherent, moves all extremities spontaneously  Psych; no hallucination, euthymic mood, not agitated      Medical Decision Making       35 MINUTES SPENT BY ME on the date of service doing chart review, history, exam, documentation & further activities per the note.  MANAGEMENT DISCUSSED with the following over the past 24 hours: yes   NOTE(S)/MEDICAL RECORDS REVIEWED over the past 24 hours: yes       Data     I have personally reviewed the following data over the past 24 hrs:    12.5 (H)  \   10.8 (L)   / 212     139 107 20.1 /  129 (H)   4.3 22 0.95 \       Imaging results reviewed over the past 24 hrs:   Recent Results (from the past 24 hour(s))   Echocardiogram Complete   Result Value    LVEF  60-65%    Narrative    [id number]  [id number]  [id number]  [id number]^[id number]^VIVEK     [facility]  Echocardiography Laboratory  [address] [place] [address]     Name: RODRIGUEZ AGUILERA  MRN: [id number]  : 1955  Study Date: 10/08/2023 12:30 PM  Age: 68 yrs  Gender: Female  Patient Location: [facility]  Reason For Study: Murmur  Ordering Physician: VIVEK SAXENA  Performed By: Donnell Cole Dr     BSA: 1.8 m2  Height: 64 in  Weight: 162 lb  HR: 58  BP: 180/85 mmHg  ______________________________________________________________________________  Procedure  Complete Portable Echo Adult. Optison (NDC #[id number]) given intravenously.  Technically difficult study.  ______________________________________________________________________________  Interpretation Summary     Left ventricular systolic function is normal.  The visual ejection fraction is 60-65%.  The left  ventricle is normal in size.  There is moderate trileaflet aortic sclerosis.  The left atrium is mild to moderately dilated.     There are no old studies for comparison  ______________________________________________________________________________  Left Ventricle  The left ventricle is normal in size. There is normal left ventricular wall  thickness. Left ventricular systolic function is normal. The visual ejection  fraction is 60-65%. Grade I or early diastolic dysfunction. Normal left  ventricular wall motion. There is no thrombus seen in the left ventricle.     Right Ventricle  The right ventricle is normal in structure, function and size. There is no  mass or thrombus in the right ventricle.     Atria  The left atrium is mild to moderately dilated. Right atrial size is normal.  There is no atrial shunt seen. The left atrial appendage is not well  visualized.     Mitral Valve  Calcified mitral apparatus. There is no mitral regurgitation noted. There is  no mitral valve stenosis.     Tricuspid Valve  Normal tricuspid valve. There is mild (1+) tricuspid regurgitation. The right  ventricular systolic pressure is approximated at 29.2 mmHg plus the right  atrial pressure. There is no tricuspid stenosis.     Aortic Valve  There is moderate trileaflet aortic sclerosis. The mean AoV pressure gradient  is 11.0 mmHg.     Pulmonic Valve  Normal pulmonic valve. There is no pulmonic valvular regurgitation. There is  no pulmonic valvular stenosis.     Vessels  The aortic root is normal size. Normal size ascending aorta. The inferior vena  cava is normal. The pulmonary artery is normal size.     Pericardium  The pericardium appears normal. There is no pleural effusion.     Rhythm  Sinus rhythm was noted.  ______________________________________________________________________________  MMode/2D Measurements & Calculations     Ao root diam: 3.1 cm  asc Aorta Diam: 3.7 cm  LVOT diam: 2.1 cm  LVOT area: 3.5 cm2  Ao root diam index  Ht(cm/m): 1.9  Ao root diam index BSA (cm/m2): 1.7  Asc Ao diam index BSA (cm/m2): 2.1  Asc Ao diam index Ht(cm/m): 2.3  RV Base: 3.8 cm  TAPSE: 2.5 cm     Doppler Measurements & Calculations  MV E max eulogio: 101.0 cm/sec  MV A max eulogio: 121.0 cm/sec  MV E/A: 0.83  MV max P.3 mmHg  MV mean P.3 mmHg  MV V2 VTI: 45.1 cm  MVA(VTI): 2.5 cm2  MV dec time: 0.30 sec  Ao V2 max: 219.5 cm/sec  Ao max P.0 mmHg  Ao V2 mean: 149.0 cm/sec  Ao mean P.0 mmHg  Ao V2 VTI: 51.5 cm  BOBO(I,D): 2.2 cm2  BOBO(V,D): 2.2 cm2  LV V1 max P.2 mmHg  LV V1 max: 134.0 cm/sec  LV V1 VTI: 31.4 cm     SV(LVOT): 111.2 ml  SI(LVOT): 62.2 ml/m2  PA V2 max: 94.5 cm/sec  PA max PG: 3.6 mmHg  PA acc time: 0.14 sec  TR max eulogio: 270.0 cm/sec  TR max P.2 mmHg  AV Eulogio Ratio (DI): 0.61  BOBO Index (cm2/m2): 1.2  E/E' av.1  Lateral E/e': 8.1  Medial E/e': 16.0  RV S Eulogio: 13.7 cm/sec     ______________________________________________________________________________  Report approved by: Dr. Blaine Perry 10/08/2023 02:25 PM         XR Surgery BRISEIDA L/T 5 Min Fluoro w Stills    Narrative    This exam was marked as non-reportable because it will not be read by a   radiologist or a [place] non-radiologist provider.

## 2023-10-09 NOTE — PLAN OF CARE
"A&Ox4. VSS. Would need A2 with lift but is declining. Offering repositioning but difficult d/t LUE and LLE pain. PRN oxy, atarax somewhat helpful but still rating pain 6-8/10. Declining tylenol as it \"does not work\" despite educating about benefits. Ice for comfort. Aquacel CDI. LUE splint CDI. Weak D/P flexion d/t pain. Pillow under calf to elevate heels. Coccyx pink, offloading with pillow. Purwick in place but has not voided yet. Bladder scanned for 396mls. TCU for discharge.   "

## 2023-10-09 NOTE — PROGRESS NOTES
10/09/23 1400   Appointment Info   Signing Clinician's Name / Credentials (OT) Radha Garcia OTR/L   Rehab Comments (OT) NWCITLALLI LUE, NWB LLE, knee immobilizer on LLE   Living Environment   People in Home spouse   Current Living Arrangements house   Home Accessibility no concerns   Transportation Anticipated family or friend will provide   Living Environment Comments Walk in shower with grab bars and shower chair. Comfort height toilet with grab bars   Self-Care   Equipment Currently Used at Home grab bar, tub/shower;walker, rolling   Fall history within last six months yes   Number of times patient has fallen within last six months 3   Activity/Exercise/Self-Care Comment At baseline pt is indep with ADLs and IADLs.   General Information   Referring Physician Daniel Felton MD   Patient/Family Therapy Goal Statement (OT) go to TCU   Additional Occupational Profile Info/Pertinent History of Current Problem per chart: Maura Downing is a 68 year old female with a history of recurrent falls since her covid infection 2 years ago and ambulates with a walker, glaucoma, systolic murmur admitted on 10/7/2023 after a mechanical fall while at [employer]. in the emergency department, the patient was found to have a temperature of 97.4  F, heart rate 52, respiratory rate 20, blood pressure 195/95, SPO2 90% on room air.  Initial lab work showed BUN/creatinine 17.4/1.0, glucose 124, CBC within normal limits.  X-ray of the left forearm revealed an acute distal radial metaphyseal fracture with mild impaction and dorsal displacement and intra-articular extension with subtle linear lucency in the distal ulna on oblique view concerning for acute nondisplaced fracture.  Left shoulder and humeral x-ray showed mildly impacted and displaced left proximal humerus fracture centered at the humeral neck with fracture line extension likely into the greater tuberosity.  Left tibia and fibular and knee x-rays showed comminuted distracted  left patellar fracture with left knee lipohemarthrosis.  CT left shoulder showed acute proximal left humerus impaction fracture with comminution with large glenohumeral joint lipohemarthrosis involving the subdeltoid bursa, chronic appearing T5 vertebral body compression fracture.  CT of the left tibia and fibula showed acute comminuted and displaced patellar fracture, lateral tibial plateau impaction fracture posteriorly, nondisplaced fracture line extending to the medial tibial plateau and tibial tuberosity, large lipohemarthrosis.  The patient was made n.p.o. and orthopedic surgery was consulted to see the patient   Existing Precautions/Restrictions weight bearing  (immobilizer on LLE)   Left Upper Extremity (Weight-bearing Status) non weight-bearing (NWB)   Left Lower Extremity (Weight-bearing Status) non weight-bearing (NWB)   Cognitive Status Examination   Orientation Status orientation to person, place and time   Sensory   Sensory Comments pt notes no numbness or tingling   Pain Assessment   Patient Currently in Pain Yes, see Vital Sign flowsheet  (8/10 pain with mobility)   Range of Motion Comprehensive   General Range of Motion other (see comments)  (RUE WNL)   Strength Comprehensive (MMT)   Comment, General Manual Muscle Testing (MMT) Assessment MMT: 3/5 R shoulder flexion   Bed Mobility   Bed Mobility supine-sit;sit-supine   Supine-Sit [place] (Bed Mobility) 2 person assist;maximum assist (25% patient effort)   Sit-Supine [place] (Bed Mobility) maximum assist (25% patient effort);2 person assist   Transfers   Transfer Comments not appropriate at this time given pain and WB limitations   Activities of Daily Living   BADL Assessment/Intervention feeding   Eating/Self Feeding   [place] Level (Feeding) set up   Clinical Impression   Criteria for Skilled Therapeutic Interventions Met (OT) Yes, treatment indicated   OT Diagnosis fall   OT Problem List-Impairments impacting ADL problems related  to;activity tolerance impaired;balance;flexibility;mobility;range of motion (ROM);strength;pain;post-surgical precautions   Assessment of Occupational Performance 5 or more Performance Deficits   Identified Performance Deficits dressing, toileting, showering, functional transfers, and IADLs   Planned Therapy Interventions (OT) ADL retraining;IADL retraining;transfer training;strengthening;home program guidelines;progressive activity/exercise   Clinical Decision Making Complexity (OT) low complexity   Anticipated Equipment Needs Upon Discharge (OT) wheelchair   Risk & Benefits of therapy have been explained evaluation/treatment results reviewed;care plan/treatment goals reviewed;risks/benefits reviewed;current/potential barriers reviewed;participants voiced agreement with care plan;participants included;patient   Clinical Impression Comments Pt is significantly below baseline due to current post surgical restrictions, pain, and weakness. Pt is highly motivated. Pt agreeable to TCU.   OT Total Evaluation Time   OT Eval, Low Complexity Minutes (41361) 10   OT Goals   Therapy Frequency (OT) Daily   OT Predicted Duration/Target Date for Goal Attainment 10/16/23   OT Goals Hygiene/Grooming;Upper Body Dressing;Lower Body Dressing;Toilet Transfer/Toileting   OT: Hygiene/Grooming supervision/stand-by assist   OT: Upper Body Dressing Supervision/stand-by assist   OT: Lower Body Dressing Supervision/stand-by assist   OT: Toilet Transfer/Toileting Supervision/stand-by assist   Interventions   Interventions Quick Adds Therapeutic Activity   Therapeutic Activities   Therapeutic Activity Minutes (84993) 10   Treatment Detail/Skilled Intervention Pt supine in bed and agreeable to work with therapy. Co-treat today with PT due to pt's complexity and requiring skilled therapists for mobility. Pt is max A with donning immobilizer.  Pt is max A with bed mobility from supine to sit with raised HOB and bed rail; assist to bring LLE to  EOB, assist to get into sitting, and assist to bring hips to EOB so RLE can touch floor for support. Pt able to sit EOB with SBA-CGA with use of bed rail of R arm and support on LLE. Pt notes 8/10  pain with mobility today. Pt requests to lay back down after approx 4-5 min. Pt is max A x2 with bed mobliity from sit to supine with assist of UB into laying positiong and assist with LLE into bed. Pt is max A x2 with boosting up in bed and positioning. Pt educated on TCU and recommendation with therapy. Pt is left in bed with call light in reach and bed alarm on.   OT Discharge Planning   OT Plan bed mobility; EOB ADLs; RUE exercises   OT Discharge Recommendation (DC Rec) Transitional Care Facility   OT Rationale for DC Rec Pt is significantly below baseline after fall with multiple fx. Pt is NWB in LUE and LLE. Recommend skilled OT during IP stay and TCU to maximize indep.   OT Brief overview of current status max A x2 bed mobility; CGA sitting EOB with use of bed rail   Total Session Time   Timed Code Treatment Minutes 10   Total Session Time (sum of timed and untimed services) 20

## 2023-10-09 NOTE — PLAN OF CARE
Patient vital signs are at baseline: Yes  Patient able to ambulate as they were prior to admission or with assist devices provided by therapies during their stay:  No,  Reason:  Pt only dangled  Patient MUST void prior to discharge:  No,  Reason:  De Anda removed, DTV  Patient able to tolerate oral intake:  Yes  Pain has adequate pain control using Oral analgesics:  No, Iv dilaudid give twice.   Does patient have an identified :  Yes  Has goal D/C date and time been discussed with patient:  No,  Reason:  PT/OT pending    Pt A&O. CMS intact. Dressing CDI. Knee immobilizer open in bed. Denied nausea and SOB. LUE splinted.

## 2023-10-10 ENCOUNTER — APPOINTMENT (OUTPATIENT)
Dept: OCCUPATIONAL THERAPY | Facility: CLINIC | Age: 68
DRG: 516 | End: 2023-10-10
Payer: MEDICARE

## 2023-10-10 ENCOUNTER — APPOINTMENT (OUTPATIENT)
Dept: PHYSICAL THERAPY | Facility: CLINIC | Age: 68
DRG: 516 | End: 2023-10-10
Payer: MEDICARE

## 2023-10-10 LAB
GLUCOSE SERPL-MCNC: 116 MG/DL (ref 70–99)
HGB BLD-MCNC: 9.9 G/DL (ref 11.7–15.7)

## 2023-10-10 PROCEDURE — 82947 ASSAY GLUCOSE BLOOD QUANT: CPT | Performed by: HOSPITALIST

## 2023-10-10 PROCEDURE — 120N000001 HC R&B MED SURG/OB

## 2023-10-10 PROCEDURE — 97530 THERAPEUTIC ACTIVITIES: CPT | Mod: GP

## 2023-10-10 PROCEDURE — 97530 THERAPEUTIC ACTIVITIES: CPT | Mod: GO

## 2023-10-10 PROCEDURE — 36415 COLL VENOUS BLD VENIPUNCTURE: CPT | Performed by: HOSPITALIST

## 2023-10-10 PROCEDURE — 250N000013 HC RX MED GY IP 250 OP 250 PS 637: Performed by: ORTHOPAEDIC SURGERY

## 2023-10-10 PROCEDURE — 99232 SBSQ HOSP IP/OBS MODERATE 35: CPT | Performed by: INTERNAL MEDICINE

## 2023-10-10 PROCEDURE — 85018 HEMOGLOBIN: CPT | Performed by: ORTHOPAEDIC SURGERY

## 2023-10-10 PROCEDURE — 250N000013 HC RX MED GY IP 250 OP 250 PS 637: Performed by: INTERNAL MEDICINE

## 2023-10-10 RX ADMIN — OXYCODONE HYDROCHLORIDE 10 MG: 5 TABLET ORAL at 10:47

## 2023-10-10 RX ADMIN — OXYCODONE HYDROCHLORIDE 5 MG: 5 TABLET ORAL at 21:29

## 2023-10-10 RX ADMIN — ACETAMINOPHEN 975 MG: 325 TABLET, FILM COATED ORAL at 05:55

## 2023-10-10 RX ADMIN — SENNOSIDES AND DOCUSATE SODIUM 1 TABLET: 8.6; 5 TABLET ORAL at 07:56

## 2023-10-10 RX ADMIN — LATANOPROST 1 DROP: 50 SOLUTION OPHTHALMIC at 21:25

## 2023-10-10 RX ADMIN — TIMOLOL MALEATE 1 DROP: 5 SOLUTION/ DROPS OPHTHALMIC at 09:07

## 2023-10-10 RX ADMIN — ASPIRIN 325 MG: 325 TABLET, COATED ORAL at 07:56

## 2023-10-10 RX ADMIN — ACETAMINOPHEN 975 MG: 325 TABLET, FILM COATED ORAL at 15:33

## 2023-10-10 RX ADMIN — ACETAMINOPHEN 975 MG: 325 TABLET, FILM COATED ORAL at 21:24

## 2023-10-10 RX ADMIN — SENNOSIDES AND DOCUSATE SODIUM 1 TABLET: 8.6; 5 TABLET ORAL at 21:24

## 2023-10-10 ASSESSMENT — ACTIVITIES OF DAILY LIVING (ADL)
ADLS_ACUITY_SCORE: 34
ADLS_ACUITY_SCORE: 34
ADLS_ACUITY_SCORE: 32
ADLS_ACUITY_SCORE: 32
ADLS_ACUITY_SCORE: 34
ADLS_ACUITY_SCORE: 32
ADLS_ACUITY_SCORE: 32
ADLS_ACUITY_SCORE: 34
ADLS_ACUITY_SCORE: 34
ADLS_ACUITY_SCORE: 32

## 2023-10-10 NOTE — PROGRESS NOTES
Care Management Follow Up    Length of Stay (days): 3    Expected Discharge Date: 10/11/2023     Concerns to be Addressed:    Insurance coverage at TCU   Patient plan of care discussed at interdisciplinary rounds: Yes    Anticipated Discharge Disposition:  TCU     Anticipated Discharge Services:  TCU  Anticipated Discharge DME:  TCU will provide    Patient/family educated on Medicare website which has current facility and service quality ratings:  yes  Education Provided on the Discharge Plan:  yes with patient and daughterPing.  Patient/Family in Agreement with the Plan:  yes    Referrals Placed by CM/SW:  yes  Private pay costs discussed: transportation costs SW discussed cost of wheelchair and stretcher ride with daughterPing. Daughter reports that they is no way that they are going to be able to transport patient in a automobile. Daughter stated that she'll discuss costs with patient and her .      Additional Information:  Spoke with daughterPing regarding the [place] TCU wondering if a claim against [employer]. Daughter reports that her mother has already spoken with [employer] and there is not going to be any claim against [employer].     Spoke with Lea, admissions at [facility] in [place]. They have accepted patient to their TCU for a Thursday 10/12/23 admission. Updated daughterPing, patient and , [place].     Plan: Patient will be discharge on Thursday if medically stable to [facility]. Discussed transportation. Patient feels she will need a stretcher as she wouldn't tolerate sitting for that hour drive.Reviewed out of pocket cost for [facility] stretcher transport, $1117.00 for base rate and $26.06 per mile to the destination. Pt/family expressed understanding and are agreeable to this.      Patient requires stretcher transportation due to multiple fractures.      SW will continue to assist with disposition.      Debra Lugo, [facility] MIKA Case  Manager  Inpatient Care Coordination   Supervisor  [facility]  [phone]  Debra Lguo, [facility] Memorial HospitalSW

## 2023-10-10 NOTE — ANESTHESIA POSTPROCEDURE EVALUATION
Patient: Maura Downing    Procedure: Procedure(s):  Open reduction internal fixation left comminuted patella fracture       Anesthesia Type:  General    Note:  Disposition: Inpatient   Postop Pain Control: Uneventful            Sign Out: Well controlled pain   PONV: No   Neuro/Psych: Uneventful            Sign Out: Acceptable/Baseline neuro status   Airway/Respiratory: Uneventful            Sign Out: Acceptable/Baseline resp. status   CV/Hemodynamics: Uneventful            Sign Out: Acceptable CV status; No obvious hypovolemia; No obvious fluid overload   Other NRE:    DID A NON-ROUTINE EVENT OCCUR? No           Last vitals:  Vitals Value Taken Time   /76 10/08/23 1842   Temp 98.9  F (37.2  C) 10/08/23 1840   Pulse 79 10/08/23 1842   Resp 18 10/08/23 1840   SpO2 95 % 10/08/23 1840       Electronically Signed By: Sarita Benavides MD  October 10, 2023  6:26 AM

## 2023-10-10 NOTE — PROGRESS NOTES
Orthopedic Surgery  Maura Downing  10/9/2023  Admit Date:  10/7/2023  POD 2 Days Post-Op  S/P Procedure(s):  Open reduction internal fixation left comminuted patella fracture    Patient is gradually improving. Pain controlled at rest.  Primary complaint of pain is the left humerus.  Tolerating oral intake.  No events overnight. Discussed pain and recovery expectations with patient.  She plans on a tcu for discharge.     BP (!) 153/56 (BP Location: Right arm, Patient Position: Sitting)   Pulse 59   Temp 96.8  F (36  C) (Temporal)   Resp 16   Wt 73.5 kg (162 lb 0.6 oz)   SpO2 99%     Alert and orient to person, place, and time  Left knee aquacel is intact with scant drainage. Moderate left knee effusion present.  Tender along anterior knee.    Minimal erythema of the surrounding skin-KI in place without areas of excessive skin rubbing.  Bilateral calves are soft, non-tender.  Left lower extremity is NVI.     Left upper extremity splint in place and well fitted. No areas of skin irritation or excessive rub.  Moderate swelling left upper arm.   Distal Left UE digits NVI      Labs:  Recent Labs   Lab Test 10/09/23  0549 10/08/23  0737 10/07/23  1145   POTASSIUM 4.3 4.0 3.7     Recent Labs   Lab Test 10/09/23  0549 10/08/23  0737 10/07/23  1145   HGB 10.8* 12.1 14.1     No results for input(s): INR in the last 44337 hours.  Recent Labs   Lab Test 10/09/23  0549 10/08/23  0737 10/07/23  1145    229 267       A/P  1. S/p ORIF left patella  2. Left impacted and nondisplaced lateral tibial plateau fracture   3. impacted, comminuted, and minimally displaced proximal humerus fracture   4. Status post closed reduction of the left shoulder in the emergency department     Plan:    Continue aspirin for DVT prophylaxis.     Mobilize with PT/OT Non-WB to BOTH left lower extremity and left upper extremity.  Knee immobilizer at all times when upright, but may remove the knee immobilizer when at rest in bed.  Assess daily  for areas of skin irritation or wear.   Sling to be applied to left UE for pain control.  Okay to remove for hygiene and skin checks.   Ice to left knee, leave Aquacel undisturbed  Continue left upper extremity splint and ice to left shoulder prn     Continue current pain regimen.    2. Disposition   Anticipate d/c to TCU pending progress.    Makayla Gomez PA-C

## 2023-10-10 NOTE — PROGRESS NOTES
[facility]    Medicine Progress Note - Hospitalist Service    Date of Admission:  10/7/2023    Assessment & Plan      Summary of Stay: Maura Downing is a 68 year old female with a history of recurrent falls since her covid infection 2 years ago and ambulates with a walker, glaucoma, systolic murmur admitted on 10/7/2023 after a mechanical fall while at [employer]. in the emergency department, the patient was found to have a temperature of 97.4  F, heart rate 52, respiratory rate 20, blood pressure 195/95, SPO2 90% on room air.  Initial lab work showed BUN/creatinine 17.4/1.0, glucose 124, CBC within normal limits.  X-ray of the left forearm revealed an acute distal radial metaphyseal fracture with mild impaction and dorsal displacement and intra-articular extension with subtle linear lucency in the distal ulna on oblique view concerning for acute nondisplaced fracture.  Left shoulder and humeral x-ray showed mildly impacted and displaced left proximal humerus fracture centered at the humeral neck with fracture line extension likely into the greater tuberosity.  Left tibia and fibular and knee x-rays showed comminuted distracted left patellar fracture with left knee lipohemarthrosis.  CT left shoulder showed acute proximal left humerus impaction fracture with comminution with large glenohumeral joint lipohemarthrosis involving the subdeltoid bursa, chronic appearing T5 vertebral body compression fracture.  CT of the left tibia and fibula showed acute comminuted and displaced patellar fracture, lateral tibial plateau impaction fracture posteriorly, nondisplaced fracture line extending to the medial tibial plateau and tibial tuberosity, large lipohemarthrosis.         TODAY'S PLAN:    Appreciate Orthopedic Surgery recommendations.   -Pain control getting much improved  -Still working with therapy services  -If not voiding freely kindly administer alternate warm and cold compress on bladder area  until voiding freely at least every 15 to 20 minutes.  -Appreciate  was working regarding discharge planning disposition.  Likely will be needing TCU placement  -Echocardiogram appears to be reassuring with normal LVEF and LV size        Problem List:   Mechanical Fall  Recurrent Falls  L Humeral Fracture with Dislocation of Humeral Head  L Radial Fracture  L Patellar Fracture  - Appreciate Orthopedic surgery recommendations   - Pain control per ortho  - PT/OT  -Social service input    #Acute blood loss anemia secondary to recent fracture and surgery, not requiring any packed RBC transfusion yet     Systolic Murmur  -Reassuring echocardiogram with trileaflet aortic sclerosis with preserved EF     Glaucoma  - Resume PTA latanoprost and timolol eye drops     Chronic T5 Compression Fracture  - As seen on CT L shoulder in the ED          Diet: Advance Diet as Tolerated: Regular Diet Adult    DVT Prophylaxis: Aspirin 3 2 5 mg daily as per orthopedic service  De Anda Catheter: Not present  Lines: None     Cardiac Monitoring: None  Code Status: Full Code      Clinically Significant Risk Factors                                 Disposition Plan      Expected Discharge Date: In the next 24 to 36 hours  Likely will need TCU approach                  Abdifatah Oliva MD, MD  Hospitalist Service  [facility]  Securely message with [employer] (more info)  Text page via [employer] Paging/Directory   ______________________________________________________________________    Interval History   Continuing medicine service care today.  Seen and examined.    Family updated as patient's  and grandson present at bedside during my encounter   I met this lady while she is laying comfortably in bed and endorsing no ongoing complaints.  She appears to be in better spirits as she mentioned that overall her pain control is significantly improved.  She endorses tolerance of oral diet with no nausea or  vomiting.  Passing some flatus.  Not voiding freely yet as she mentioned she has been requiring some intermittent straight catheterization.  She is not really requiring any oxygen support.  Currently afebrile.  No reports of being agitated or combative overnight.  No bleeding tendencies.        Physical Exam   Vital Signs: Temp: 96.8  F (36  C) Temp src: Temporal BP: (!) 171/56 Pulse: 59   Resp: 16 SpO2: 99 % O2 Device: None (Room air)    Weight: 162 lbs .61 oz    HEENT; Atraumatic, normocephalic, pinkish conjuctiva, pupils bilateral reactive   Skin: warm and moist, no rashes  Dressing in place on left lower extremity and upper extremity with splint  Lungs: equal chest expansion, clear to auscultation, no wheezes, no stridor, no crackles,   Heart: normal rate, normal rhythm, no rubs or gallops.   Abdomen: normal bowel sounds, no tenderness, no peritoneal signs, no guarding  Extremities: no deformities, no edema   Neuro; follow commands, alert and oriented x3, spontaneous speech, coherent, moves all extremities spontaneously  Psych; no hallucination, euthymic mood, not agitated      Medical Decision Making       40 MINUTES SPENT BY ME on the date of service doing chart review, history, exam, documentation & further activities per the note.  MANAGEMENT DISCUSSED with the following over the past 24 hours: Yes   NOTE(S)/MEDICAL RECORDS REVIEWED over the past 24 hours: Yes       Data     I have personally reviewed the following data over the past 24 hrs:    N/A  \   9.9 (L)   / N/A     N/A N/A N/A /  116 (H)   N/A N/A N/A \     Imaging results reviewed over the past 24 hrs:   No results found for this or any previous visit (from the past 24 hour(s)).

## 2023-10-10 NOTE — PLAN OF CARE
Goal Outcome Evaluation:       .Patient vital signs are at baseline: Yes  Patient able to ambulate as they were prior to admission or with assist devices provided by therapies during their stay:  No,  Reason: pt is not getting out bed , declines states its a lot of pain.  Patient MUST void prior to discharge:  No,  Reason:  due to void , had to straight cath for 850  Patient able to tolerate oral intake:  Yes  Pain has adequate pain control using Oral analgesics:  Yes  Does patient have an identified :  Yes  Has goal D/C date and time been discussed with patient:  No,  Reason: to be determine    Pt is Alert and oriented x 4, VSS except for BP can be elevated when she is in pain.turn and repositioning d/t LUE and LLE pain.prn oxy, Atarax  and tylenol states does not help . Aquacel CDI, LUE splint CDI , weak D/P flexion d/t pain. Coccyx pink. Due to void had to straight cath for 850 ml at 0215. TCU for discharge.

## 2023-10-10 NOTE — PLAN OF CARE
"Pt is A&Ox4 with stable VS at this time. This morning (0756) BP was 171/56 but was down to 128/51 at 1514. Pt reports that \"pain is more manageable today\". Pt reported pain levels between 3-4 throughout shift.  PRN oxy and scheduled tylenol effective for pain management. Encouraged pt with PT/OT to move to the chair to relieve pressure after being in bed since surgery as coccyx is pink. Moved patient to chair Ax2 with lift. Patient stated \"this is a lot more comfortable than expected\". Pt provided pillows to elevate heels and elbows.    Pt due to void. Bladder scanned at 1000 results of 89 mLs. Bladder scanned at 1630 results of 280 mLs. Moved patient to commode before back to bed using lift. Pt was unable to void. Straight cath done. Educated patient on importance of voiding if she has to go.    Pt has an immobilizer brace on left leg and ice. Pt has a splint on CDI and added sling today for support.      "

## 2023-10-11 ENCOUNTER — APPOINTMENT (OUTPATIENT)
Dept: PHYSICAL THERAPY | Facility: CLINIC | Age: 68
DRG: 516 | End: 2023-10-11
Payer: MEDICARE

## 2023-10-11 ENCOUNTER — APPOINTMENT (OUTPATIENT)
Dept: OCCUPATIONAL THERAPY | Facility: CLINIC | Age: 68
DRG: 516 | End: 2023-10-11
Payer: MEDICARE

## 2023-10-11 LAB
ALBUMIN UR-MCNC: NEGATIVE MG/DL
ANION GAP SERPL CALCULATED.3IONS-SCNC: 8 MMOL/L (ref 7–15)
APPEARANCE UR: CLEAR
BASO+EOS+MONOS # BLD AUTO: ABNORMAL 10*3/UL
BASO+EOS+MONOS NFR BLD AUTO: ABNORMAL %
BASOPHILS # BLD AUTO: 0.1 10E3/UL (ref 0–0.2)
BASOPHILS NFR BLD AUTO: 1 %
BILIRUB UR QL STRIP: NEGATIVE
BUN SERPL-MCNC: 20.7 MG/DL (ref 8–23)
CALCIUM SERPL-MCNC: 8.3 MG/DL (ref 8.8–10.2)
CHLORIDE SERPL-SCNC: 105 MMOL/L (ref 98–107)
COLOR UR AUTO: YELLOW
CREAT SERPL-MCNC: 0.93 MG/DL (ref 0.51–0.95)
DEPRECATED HCO3 PLAS-SCNC: 26 MMOL/L (ref 22–29)
EGFRCR SERPLBLD CKD-EPI 2021: 66 ML/MIN/1.73M2
EOSINOPHIL # BLD AUTO: 0.7 10E3/UL (ref 0–0.7)
EOSINOPHIL NFR BLD AUTO: 8 %
ERYTHROCYTE [DISTWIDTH] IN BLOOD BY AUTOMATED COUNT: 13.1 % (ref 10–15)
GLUCOSE SERPL-MCNC: 115 MG/DL (ref 70–99)
GLUCOSE UR STRIP-MCNC: NEGATIVE MG/DL
HCT VFR BLD AUTO: 30.5 % (ref 35–47)
HGB BLD-MCNC: 10.1 G/DL (ref 11.7–15.7)
HGB UR QL STRIP: NEGATIVE
IMM GRANULOCYTES # BLD: 0.1 10E3/UL
IMM GRANULOCYTES NFR BLD: 1 %
KETONES UR STRIP-MCNC: NEGATIVE MG/DL
LEUKOCYTE ESTERASE UR QL STRIP: NEGATIVE
LYMPHOCYTES # BLD AUTO: 2.7 10E3/UL (ref 0.8–5.3)
LYMPHOCYTES NFR BLD AUTO: 28 %
MCH RBC QN AUTO: 31.6 PG (ref 26.5–33)
MCHC RBC AUTO-ENTMCNC: 33.1 G/DL (ref 31.5–36.5)
MCV RBC AUTO: 95 FL (ref 78–100)
MONOCYTES # BLD AUTO: 0.8 10E3/UL (ref 0–1.3)
MONOCYTES NFR BLD AUTO: 8 %
MUCOUS THREADS #/AREA URNS LPF: PRESENT /LPF
NEUTROPHILS # BLD AUTO: 5.4 10E3/UL (ref 1.6–8.3)
NEUTROPHILS NFR BLD AUTO: 54 %
NITRATE UR QL: NEGATIVE
NRBC # BLD AUTO: 0 10E3/UL
NRBC BLD AUTO-RTO: 0 /100
PH UR STRIP: 5 [PH] (ref 5–7)
PLATELET # BLD AUTO: 213 10E3/UL (ref 150–450)
POTASSIUM SERPL-SCNC: 3.7 MMOL/L (ref 3.4–5.3)
RBC # BLD AUTO: 3.2 10E6/UL (ref 3.8–5.2)
RBC URINE: 2 /HPF
SODIUM SERPL-SCNC: 139 MMOL/L (ref 135–145)
SP GR UR STRIP: 1.02 (ref 1–1.03)
SQUAMOUS EPITHELIAL: <1 /HPF
UROBILINOGEN UR STRIP-MCNC: NORMAL MG/DL
WBC # BLD AUTO: 9.7 10E3/UL (ref 4–11)
WBC URINE: 2 /HPF

## 2023-10-11 PROCEDURE — 120N000001 HC R&B MED SURG/OB

## 2023-10-11 PROCEDURE — 99232 SBSQ HOSP IP/OBS MODERATE 35: CPT | Performed by: INTERNAL MEDICINE

## 2023-10-11 PROCEDURE — 80048 BASIC METABOLIC PNL TOTAL CA: CPT | Performed by: INTERNAL MEDICINE

## 2023-10-11 PROCEDURE — 250N000013 HC RX MED GY IP 250 OP 250 PS 637: Performed by: INTERNAL MEDICINE

## 2023-10-11 PROCEDURE — 97530 THERAPEUTIC ACTIVITIES: CPT | Mod: GP

## 2023-10-11 PROCEDURE — 97530 THERAPEUTIC ACTIVITIES: CPT | Mod: GO

## 2023-10-11 PROCEDURE — 81001 URINALYSIS AUTO W/SCOPE: CPT | Performed by: INTERNAL MEDICINE

## 2023-10-11 PROCEDURE — 36415 COLL VENOUS BLD VENIPUNCTURE: CPT | Performed by: INTERNAL MEDICINE

## 2023-10-11 PROCEDURE — 250N000013 HC RX MED GY IP 250 OP 250 PS 637: Performed by: ORTHOPAEDIC SURGERY

## 2023-10-11 PROCEDURE — 85004 AUTOMATED DIFF WBC COUNT: CPT | Performed by: INTERNAL MEDICINE

## 2023-10-11 RX ORDER — AMLODIPINE BESYLATE 2.5 MG/1
2.5 TABLET ORAL DAILY
Status: DISCONTINUED | OUTPATIENT
Start: 2023-10-11 | End: 2023-10-12 | Stop reason: HOSPADM

## 2023-10-11 RX ORDER — AMLODIPINE BESYLATE 2.5 MG/1
2.5 TABLET ORAL DAILY
Qty: 30 TABLET | Refills: 0 | DISCHARGE
Start: 2023-10-12

## 2023-10-11 RX ADMIN — SENNOSIDES AND DOCUSATE SODIUM 1 TABLET: 8.6; 5 TABLET ORAL at 21:19

## 2023-10-11 RX ADMIN — SENNOSIDES AND DOCUSATE SODIUM 1 TABLET: 8.6; 5 TABLET ORAL at 08:38

## 2023-10-11 RX ADMIN — OXYCODONE HYDROCHLORIDE 10 MG: 5 TABLET ORAL at 06:58

## 2023-10-11 RX ADMIN — ASPIRIN 325 MG: 325 TABLET, COATED ORAL at 08:38

## 2023-10-11 RX ADMIN — ACETAMINOPHEN 975 MG: 325 TABLET, FILM COATED ORAL at 21:19

## 2023-10-11 RX ADMIN — ACETAMINOPHEN 975 MG: 325 TABLET, FILM COATED ORAL at 05:39

## 2023-10-11 RX ADMIN — LATANOPROST 1 DROP: 50 SOLUTION OPHTHALMIC at 21:19

## 2023-10-11 RX ADMIN — OXYCODONE HYDROCHLORIDE 5 MG: 5 TABLET ORAL at 12:59

## 2023-10-11 RX ADMIN — HYDROXYZINE HYDROCHLORIDE 10 MG: 10 TABLET ORAL at 15:25

## 2023-10-11 RX ADMIN — TIMOLOL MALEATE 1 DROP: 5 SOLUTION/ DROPS OPHTHALMIC at 08:38

## 2023-10-11 RX ADMIN — ACETAMINOPHEN 975 MG: 325 TABLET, FILM COATED ORAL at 13:00

## 2023-10-11 RX ADMIN — AMLODIPINE BESYLATE 2.5 MG: 2.5 TABLET ORAL at 12:59

## 2023-10-11 ASSESSMENT — ACTIVITIES OF DAILY LIVING (ADL)
ADLS_ACUITY_SCORE: 34

## 2023-10-11 NOTE — PROGRESS NOTES
Care Management Discharge Note    Discharge Date: 10/12/2023       Discharge Disposition: Transitional Care, Skilled Nursing Facility    Discharge Services:      Discharge DME:      Discharge Transportation: agency    Private pay costs discussed: transportation costs    Does the patient's insurance plan have a 3 day qualifying hospital stay waiver?  No    PAS Confirmation Code: [id number]  Patient/family educated on Medicare website which has current facility and service quality ratings:      Education Provided on the Discharge Plan:  yes  Persons Notified of Discharge Plans: patient and TCU  Patient/Family in Agreement with the Plan: yes    Additional Information:   stretcher transportation is scheduled for 10/12/23, tomorrow 10:56-11:41.  Informed pt and [facility] of transportation.    ISAIAS Silva, [facility]  Inpatient Care Coordination  [facility]  [phone]      KATE HUGHES

## 2023-10-11 NOTE — PLAN OF CARE
Goal Outcome Evaluation:         .Patient vital signs are at baseline: Yes  Patient able to ambulate as they were prior to admission or with assist devices provided by therapies during their stay:  No,  Reason:  lift  Patient MUST void prior to discharge:  No,  Reason:  due to void  Patient able to tolerate oral intake:  Yes  Pain has adequate pain control using Oral analgesics:  Yes  Does patient have an identified :  Yes  Has goal D/C date and time been discussed with patient:  Yes      Pt is alert and oriented x 4, able to make needs known. Pt has not been able to void on her own bladder scan for 291. Pt has sling on her left arm and knee immobilizer brace on left leg. Oxycodone 5 mg given for pain.Plan to discharge to TCU     0645  Got pt up to bedside commode did not void sitting on the recliner.

## 2023-10-11 NOTE — PROGRESS NOTES
Orthopedic Surgery  Maura Downing  10/9/2023  Admit Date:  10/7/2023  POD 3 Days Post-Op  S/P Procedure(s):  Open reduction internal fixation left comminuted patella fracture    Pain controlled at rest. No new ortho complaints today. Tolerating oral intake. Discussed pain and recovery expectations with patient.  She is still planning on TCU for discharge     BP (!) 149/65 (BP Location: Right arm)   Pulse 61   Temp 98  F (36.7  C) (Temporal)   Resp 18   Wt 73.5 kg (162 lb 0.6 oz)   SpO2 99%     Alert and orient to person, place, and time   Minimal erythema of the surrounding skin-KI in place without areas of excessive skin rubbing.  Bilateral calves are soft, non-tender.  Left lower extremity is NVI.     Left upper extremity splint in place and well fitted. No areas of skin irritation or excessive rub.  Moderate swelling left upper arm.   Distal Left UE digits NVI  Flex/extends digits      Labs:  Recent Labs   Lab Test 10/09/23  0549 10/08/23  0737 10/07/23  1145   POTASSIUM 4.3 4.0 3.7     Recent Labs   Lab Test 10/09/23  0549 10/08/23  0737 10/07/23  1145   HGB 10.8* 12.1 14.1     No results for input(s): INR in the last 19075 hours.  Recent Labs   Lab Test 10/09/23  0549 10/08/23  0737 10/07/23  1145    229 267       A/P  1. S/p ORIF left patella  2. Left impacted and nondisplaced lateral tibial plateau fracture   3. impacted, comminuted, and minimally displaced proximal humerus fracture   4. Status post closed reduction of the left shoulder in the emergency department     Plan:    Continue aspirin for DVT prophylaxis.     Mobilize with PT/OT Non-WB to BOTH left lower extremity and left upper extremity.  Knee immobilizer at all times when upright, but may remove the knee immobilizer when at rest in bed.  Assess daily for areas of skin irritation or wear.   Sling to be applied to left UE for pain control.  Okay to remove for hygiene and skin checks.   Ice to left knee, leave Aquacel  undisturbed  Continue left upper extremity splint and ice to left shoulder prn     Continue current pain regimen.    2. Disposition   Anticipate d/c to TCU pending progress.    Sindi Garrett PA-C

## 2023-10-11 NOTE — PROGRESS NOTES
[facility]    Medicine Progress Note - Hospitalist Service    Date of Admission:  10/7/2023    Assessment & Plan      Summary of Stay: Maura Downing is a 68 year old female with a history of recurrent falls since her covid infection 2 years ago and ambulates with a walker, glaucoma, systolic murmur admitted on 10/7/2023 after a mechanical fall while at [employer]. in the emergency department, the patient was found to have a temperature of 97.4  F, heart rate 52, respiratory rate 20, blood pressure 195/95, SPO2 90% on room air.  Initial lab work showed BUN/creatinine 17.4/1.0, glucose 124, CBC within normal limits.  X-ray of the left forearm revealed an acute distal radial metaphyseal fracture with mild impaction and dorsal displacement and intra-articular extension with subtle linear lucency in the distal ulna on oblique view concerning for acute nondisplaced fracture.  Left shoulder and humeral x-ray showed mildly impacted and displaced left proximal humerus fracture centered at the humeral neck with fracture line extension likely into the greater tuberosity.  Left tibia and fibular and knee x-rays showed comminuted distracted left patellar fracture with left knee lipohemarthrosis.  CT left shoulder showed acute proximal left humerus impaction fracture with comminution with large glenohumeral joint lipohemarthrosis involving the subdeltoid bursa, chronic appearing T5 vertebral body compression fracture.  CT of the left tibia and fibula showed acute comminuted and displaced patellar fracture, lateral tibial plateau impaction fracture posteriorly, nondisplaced fracture line extending to the medial tibial plateau and tibial tuberosity, large lipohemarthrosis.         TODAY'S PLAN:    Appreciate Orthopedic Surgery recommendations.   -Pain control getting much improved  -Still working with therapy services  -Please send urine analysis for completion of work-up regarding this urinary retention.   Suspecting very underlying urinary retention secondary to her immobility, recent surgery, narcotics.  -If not voiding freely kindly administer alternate warm and cold compress on bladder area until voiding freely at least every 15 to 20 minutes.  -Appreciate  was working regarding discharge planning disposition.  Likely will be needing TCU placement  -Echocardiogram appears to be reassuring with normal LVEF and LV size        Problem List:   Mechanical Fall  Recurrent Falls  L Humeral Fracture with Dislocation of Humeral Head  L Radial Fracture  L Patellar Fracture  - Appreciate Orthopedic surgery recommendations   - Pain control per ortho  - PT/OT  -Social service input    #Acute blood loss anemia secondary to recent fracture and surgery, not requiring any packed RBC transfusion yet     Systolic Murmur  -Reassuring echocardiogram with trileaflet aortic sclerosis with preserved EF     Glaucoma  - Resume PTA latanoprost and timolol eye drops     Chronic T5 Compression Fracture  - As seen on CT L shoulder in the ED    Hypertension  -No prior known history but monitoring of blood pressure levels here showing persistently elevated levels  -We will do a trial of low-dose amlodipine for now  -Heart rate currently at a slow side at high 50s to low 60s.            Diet: Advance Diet as Tolerated: Regular Diet Adult    DVT Prophylaxis: Aspirin 3 2 5 mg daily as per orthopedic service  De Anda Catheter: Not present  Lines: None     Cardiac Monitoring: None  Code Status: Full Code      Clinically Significant Risk Factors                                 Disposition Plan      Expected Discharge Date: In the next 24 to 36 hours  Likely will need TCU approach                  Abdifatah Oliva MD, MD  Hospitalist Service  [facility]  Securely message with [employer] (more info)  Text page via [employer] Paging/Directory    ______________________________________________________________________    Interval History   Continuing medicine service care today.  Seen and examined.    I met Maura this morning while she is sitting comfortably in the hospital chair.  She mentioned she was able to tolerate oral diet last night.  No reports of any nausea or vomiting.  Passing some flatus but no meaningful BM yet.  Still having issues with urinary retention requiring intermittent straight catheterization.  She denies any abdominal or hypogastric pain or discomfort.  No reported bleeding tendencies.  Remained afebrile.  Pain is under reasonable control.  No reported mental status changes overnight.  Currently not using any oxygen support.        Physical Exam   Vital Signs: Temp: 98  F (36.7  C) Temp src: Temporal BP: (!) 149/65 Pulse: 61   Resp: 18 SpO2: 99 % O2 Device: None (Room air)    Weight: 162 lbs .61 oz    HEENT; Atraumatic, normocephalic, pinkish conjuctiva, pupils bilateral reactive   Skin: warm and moist, no rashes  Dressing in place on left lower extremity and upper extremity with splint  Lungs: equal chest expansion, clear to auscultation, no wheezes, no stridor, no crackles,   Heart: normal rate, normal rhythm, no rubs or gallops.   Abdomen: normal bowel sounds, no tenderness, no peritoneal signs, no guarding  Extremities: no deformities, no edema   Neuro; follow commands, alert and oriented x3, spontaneous speech, coherent, moves all extremities spontaneously  Psych; no hallucination, euthymic mood, not agitated      Medical Decision Making       40 MINUTES SPENT BY ME on the date of service doing chart review, history, exam, documentation & further activities per the note.  MANAGEMENT DISCUSSED with the following over the past 24 hours: Yes   NOTE(S)/MEDICAL RECORDS REVIEWED over the past 24 hours: Yes       Data     I have personally reviewed the following data over the past 24 hrs:    9.7  \   10.1 (L)   / 213     139 105 20.7 /   115 (H)   3.7 26 0.93 \     Imaging results reviewed over the past 24 hrs:   No results found for this or any previous visit (from the past 24 hour(s)).

## 2023-10-11 NOTE — PROGRESS NOTES
SPIRITUAL HEALTH SERVICES - Progress Note  [facility].    Referral Source: length of stay.    Present: Pt was alone in her Room.    Assessment/Intervention: Introduced Pt to [facility]. Pt named his  and her two daughters as supportive people in her life.I provided devotional reading.Pt welcomed prayer.    Plan: I and other Chaplains remain available as needed.  Sly Zamora, [facility]    Intern    [facility] routine referrals *[phone]  [facility] available 24/7 for emergent requests/referrals, either by paging the on-call  or by entering an ASAP/STAT consult in Epic (this will also page the on-call ).

## 2023-10-11 NOTE — CARE PLAN
Goal Outcome Evaluation:    Patient vital signs are at baseline: No, started amlodipine this after noon for HTN  Patient able to ambulate as they were prior to admission or with assist devices provided by therapies during their stay:  No,  Reason:  Assist of 2 using Lift  Patient MUST void prior to discharge:  Yes  Patient able to tolerate oral intake:  Yes  Pain has adequate pain control using Oral analgesics:  Yes  Does patient have an identified :  Yes  Has goal D/C date and time been discussed with patient:  Yes     Pt A&O. VS stable. Pain managed with scheduled and PRN medications. CMS: Patient denies numbness/tingling. Ambulation: Patient is non-weight bearing to LLE and LUE. Knee Immobilizer to LLE to remain on at all times. Dressing C/D/I with scant drainage- outlined and dated. Regular diet but refuses to eat. Patient had bladder scan at 1400 showing 758mL; straight-cathed with 750mL output. UA collected, see results. Bladder scan at 1830 showing 145mL. Attempted heat/cold therapy to bladder to promote urination, unsuccessful. Patient refused voiding via commode this afternoon. Plan: Discharge to TCU [facility] tomorrow.    BP (!) 175/71   Pulse 64   Temp 97.4  F (36.3  C) (Temporal)   Resp 16   Wt 73.5 kg (162 lb 0.6 oz)   SpO2 100%

## 2023-10-12 VITALS
OXYGEN SATURATION: 97 % | RESPIRATION RATE: 16 BRPM | WEIGHT: 162.04 LBS | HEART RATE: 64 BPM | SYSTOLIC BLOOD PRESSURE: 161 MMHG | DIASTOLIC BLOOD PRESSURE: 74 MMHG | TEMPERATURE: 97.1 F

## 2023-10-12 LAB
ANION GAP SERPL CALCULATED.3IONS-SCNC: 6 MMOL/L (ref 7–15)
BUN SERPL-MCNC: 18 MG/DL (ref 8–23)
CALCIUM SERPL-MCNC: 8.3 MG/DL (ref 8.8–10.2)
CHLORIDE SERPL-SCNC: 107 MMOL/L (ref 98–107)
CREAT SERPL-MCNC: 0.88 MG/DL (ref 0.51–0.95)
DEPRECATED HCO3 PLAS-SCNC: 25 MMOL/L (ref 22–29)
EGFRCR SERPLBLD CKD-EPI 2021: 70 ML/MIN/1.73M2
GLUCOSE SERPL-MCNC: 106 MG/DL (ref 70–99)
POTASSIUM SERPL-SCNC: 3.4 MMOL/L (ref 3.4–5.3)
SODIUM SERPL-SCNC: 138 MMOL/L (ref 135–145)

## 2023-10-12 PROCEDURE — 99239 HOSP IP/OBS DSCHRG MGMT >30: CPT | Performed by: INTERNAL MEDICINE

## 2023-10-12 PROCEDURE — 80048 BASIC METABOLIC PNL TOTAL CA: CPT | Performed by: INTERNAL MEDICINE

## 2023-10-12 PROCEDURE — 250N000013 HC RX MED GY IP 250 OP 250 PS 637: Performed by: ORTHOPAEDIC SURGERY

## 2023-10-12 PROCEDURE — 36415 COLL VENOUS BLD VENIPUNCTURE: CPT | Performed by: INTERNAL MEDICINE

## 2023-10-12 PROCEDURE — 250N000013 HC RX MED GY IP 250 OP 250 PS 637: Performed by: INTERNAL MEDICINE

## 2023-10-12 RX ADMIN — ACETAMINOPHEN 975 MG: 325 TABLET, FILM COATED ORAL at 06:13

## 2023-10-12 RX ADMIN — TIMOLOL MALEATE 1 DROP: 5 SOLUTION/ DROPS OPHTHALMIC at 08:51

## 2023-10-12 RX ADMIN — OXYCODONE HYDROCHLORIDE 5 MG: 5 TABLET ORAL at 10:30

## 2023-10-12 RX ADMIN — ASPIRIN 325 MG: 325 TABLET, COATED ORAL at 08:49

## 2023-10-12 RX ADMIN — SENNOSIDES AND DOCUSATE SODIUM 1 TABLET: 8.6; 5 TABLET ORAL at 08:49

## 2023-10-12 ASSESSMENT — ACTIVITIES OF DAILY LIVING (ADL)
ADLS_ACUITY_SCORE: 34
ADLS_ACUITY_SCORE: 36
ADLS_ACUITY_SCORE: 38
ADLS_ACUITY_SCORE: 34

## 2023-10-12 NOTE — PLAN OF CARE
Occupational Therapy Discharge Summary    Reason for therapy discharge:    Discharged to transitional care facility.    Progress towards therapy goal(s). See goals on Care Plan in [facility] electronic health record for goal details.  Goals not met.  Barriers to achieving goals:   discharge from facility.    Therapy recommendation(s):    Continued therapy is recommended.  Rationale/Recommendations:  recommended skilled OT to maximize indep with ADLs/IADLs.

## 2023-10-12 NOTE — PLAN OF CARE
Goal Outcome Evaluation:      Plan of Care Reviewed With: patient      Patient vital signs are at baseline: Yes  Patient able to ambulate as they were prior to admission or with assist devices provided by therapies during their stay:  No,  Reason:  Assist x 2 with lift device  Patient MUST void prior to discharge:  No,  Reason:  Indwelling catheter in place and is patent. Having adequate amount of output.  Patient able to tolerate oral intake:  Yes  Pain has adequate pain control using Oral analgesics:  Yes  Does patient have an identified :  Yes  Has goal D/C date and time been discussed with patient:  Yes    Alert and oriented x 4. VSS. O2 Sat 96% on room air. LS clear to auscultation. BS+. CMS intact. Dressing CDI. C/o Left upper and lower extremity pain. PRN oxycodone 5 mg given. Daughter at bedside.      Discharging today to TCU via transportation

## 2023-10-12 NOTE — PROGRESS NOTES
Care Management Discharge Note    Discharge Date: 10/12/2023       Discharge Disposition: Transitional Care, Skilled Nursing Facility    Discharge Services:      Discharge DME:      Discharge Transportation: agency    Private pay costs discussed: transportation costs    Does the patient's insurance plan have a 3 day qualifying hospital stay waiver?  No    PAS Confirmation Code: [id number]  Patient/family educated on Medicare website which has current facility and service quality ratings:      Education Provided on the Discharge Plan:    Persons Notified of Discharge Plans: patient and TCU  Patient/Family in Agreement with the Plan: yes    Additional Information:  Pt transferring to [facility] via  stretcher today.  Faxed orders and informed TCU they were sent.    ISAIAS Silva, [facility]  Inpatient Care Coordination  [facility]  [phone]       KATE HUGHES

## 2023-10-12 NOTE — PROGRESS NOTES
Patient's After Visit Summary was reviewed with patient and/or Discharged to TCU.   Patient verbalized understanding of After Visit Summary, recommended follow up and was given an opportunity to ask questions.   Discharge medications sent home with patient/family: Not applicable   Discharged with transport tech        Discharged with all of her belongings. AVS given to transport tech to handover to receiving nurse at TCU

## 2023-10-12 NOTE — PLAN OF CARE
Physical Therapy Discharge Summary    Reason for therapy discharge:    Discharged to transitional care facility.    Progress towards therapy goal(s). See goals on Care Plan in [facility] electronic health record for goal details.  Goals not met.  Barriers to achieving goals:   discharge from facility.    Therapy recommendation(s):    Continued therapy is recommended.  Rationale/Recommendations:  Recommend continued therapy at TCU to progress independence with mobility.

## 2023-10-12 NOTE — PLAN OF CARE
Goal Outcome Evaluation:      Plan of Care Reviewed With: patient    Overall Patient Progress: no changeOverall Patient Progress: no change       Patient vital signs are at baseline: Yes  Patient able to ambulate as they were prior to admission or with assist devices provided by therapies during their stay:  No,  Reason:  Ax2 lift  Patient MUST void prior to discharge:  No,  Reason:  De Anda placed  Patient able to tolerate oral intake:  Yes  Pain has adequate pain control using Oral analgesics:  Yes  Does patient have an identified :  Yes  Has goal D/C date and time been discussed with patient:  Yes - TCU today    Pt A&O. Dressing with small drainage. CMS intact. Knee immobilizer on.

## 2023-10-12 NOTE — PROGRESS NOTES
Orthopedic Surgery  Maura Downing  10/9/2023  Admit Date:  10/7/2023  POD 4 Days Post-Op  S/P Procedure(s):  Open reduction internal fixation left comminuted patella fracture    Pain controlled at rest. No new ortho complaints today. Tolerating oral intake. Discussed pain and recovery expectations with patient.      BP (!) 161/74 (BP Location: Right arm)   Pulse 64   Temp 97.1  F (36.2  C) (Temporal)   Resp 16   Wt 73.5 kg (162 lb 0.6 oz)   SpO2 97%     Alert and orient to person, place, and time   Minimal erythema of the surrounding skin-KI in place without areas of excessive skin rubbing.  Scant drainage on aquacel dressing.   Bilateral calves are soft, non-tender.  Left lower extremity is NVI.     Left upper extremity splint in place and well fitted. No areas of skin irritation or excessive rub.  Moderate swelling left upper arm.   Distal Left UE digits NVI  Flex/extends digits      Labs:  Recent Labs   Lab Test 10/09/23  0549 10/08/23  0737 10/07/23  1145   POTASSIUM 4.3 4.0 3.7     Recent Labs   Lab Test 10/09/23  0549 10/08/23  0737 10/07/23  1145   HGB 10.8* 12.1 14.1     No results for input(s): INR in the last 05400 hours.  Recent Labs   Lab Test 10/09/23  0549 10/08/23  0737 10/07/23  1145    229 267       Assessment:  1. S/p ORIF left patella  2. Left impacted and nondisplaced lateral tibial plateau fracture   3. impacted, comminuted, and minimally displaced proximal humerus fracture   4. Status post closed reduction of the left shoulder in the emergency department     Plan:    Continue aspirin for DVT prophylaxis.     Mobilize with PT/OT Non-WB to BOTH left lower extremity and left upper extremity.  Knee immobilizer at all times when upright, but may remove the knee immobilizer when at rest in bed.  Assess daily for areas of skin irritation or wear. No flexion left knee.   Sling to be applied to left UE for pain control.  Okay to remove for hygiene and skin checks.   Ice to left knee, leave  Aquacel undisturbed  Continue left upper extremity splint and ice to left shoulder prn     Continue current pain regimen.    2. Disposition   Anticipate d/c to TCU pending progress.  Ortho stable.     Makayla Gomez PA-C

## 2023-10-12 NOTE — DISCHARGE SUMMARY
[facility]  Hospitalist Discharge Summary      Date of Admission:  10/7/2023  Date of Discharge:  10/12/2023  Discharging Provider: Abdifatah Oliva MD, MD  Discharge Service: Hospitalist Service    Discharge Diagnoses   Mechanical Fall  Recurrent Falls  L Humeral Fracture with Dislocation of Humeral Head  L Radial Fracture  L Patellar Fracture  Postoperative state status post ORIF left comminuted patella fracture  Newly diagnosed benign essential hypertension  Urinary retention likely secondary to recent injuries, nonweightbearing status currently on De Anda catheter  History of chronic T5 compression fracture    Clinically Significant Risk Factors          Follow-ups Needed After Discharge   Follow-up Appointments     Follow Up and recommended labs and tests      Follow up with Dr. Felton at [facility] 2 weeks after   surgery.  If still in TCU, can be seen by the orthopedic provider at the   care facility (if this is an option).    Call the care coordinator at [phone] to arrange the appt.   [facility] [place]: [phone]  [facility] [place]: [phone]  [facility] [place]:[phone]  Walk-in clinic 8am-8pm        Follow Up and recommended labs and tests      Follow up with Nursing home physician.    Follow up with ortho as scheduled            Unresulted Labs Ordered in the Past 30 Days of this Admission       No orders found from 9/7/2023 to 10/8/2023.            Discharge Disposition     Being discharged to transitional care unit  Condition at discharge: Stable    Hospital Course     Continuing service care today.  Seen and examined.  Chart reviewed.  I met this lady while she is sleeping comfortably this morning but easily aroused with minimal verbal stimuli.  Ms. Lorenzo remains very pleasant, conversational and redirectable.  She endorses no ongoing severe uncontrolled pain.  She also mentioned she is able to tolerate oral intake with no nausea or vomiting.  She is still passing  flatus but no meaningful or satisfying bowel movement yet.  Issues with urinary retention has been ongoing initially requiring intermittent straight catheterization and continues to have urinary retention all throughout her stay.  This is likely being precipitated by her underlying deconditioned state and also with nonweightbearing status given her multiple injuries.  Decisions made to insert indwelling De Adna catheter will be continued even upon discharge going to TCU setting.  Recommendations to do voiding trial at the TCU once she is much more up and about at least in the next couple of days.  Part of the work-up with her urinary retention was sending a urine analysis which showed no clear evidence of underlying infectious process.  She is agreeable for TCU discharge placement and will proceed with that today.  Will defer to orthopedic service pain control, DVT prophylaxis and as prescribed with stool softeners and as needed laxatives.  She was prescribed with antihypertensive agent given elevated blood pressure levels here.  Needs to closely monitor blood pressure levels in a TCU and upon follow-up with PCP.    I will refer you to x-rays of prior progress notes as listed below for other details of her hospitalization stay:    Summary of Stay: Maura Downing is a 68 year old female with a history of recurrent falls since her covid infection 2 years ago and ambulates with a walker, glaucoma, systolic murmur admitted on 10/7/2023 after a mechanical fall while at [facility]. in the emergency department, the patient was found to have a temperature of 97.4  F, heart rate 52, respiratory rate 20, blood pressure 195/95, SPO2 90% on room air.  Initial lab work showed BUN/creatinine 17.4/1.0, glucose 124, CBC within normal limits.  X-ray of the left forearm revealed an acute distal radial metaphyseal fracture with mild impaction and dorsal displacement and intra-articular extension with subtle linear lucency in the distal ulna  on oblique view concerning for acute nondisplaced fracture.  Left shoulder and humeral x-ray showed mildly impacted and displaced left proximal humerus fracture centered at the humeral neck with fracture line extension likely into the greater tuberosity.  Left tibia and fibular and knee x-rays showed comminuted distracted left patellar fracture with left knee lipohemarthrosis.  CT left shoulder showed acute proximal left humerus impaction fracture with comminution with large glenohumeral joint lipohemarthrosis involving the subdeltoid bursa, chronic appearing T5 vertebral body compression fracture.  CT of the left tibia and fibula showed acute comminuted and displaced patellar fracture, lateral tibial plateau impaction fracture posteriorly, nondisplaced fracture line extending to the medial tibial plateau and tibial tuberosity, large lipohemarthrosis.         TODAY'S PLAN:    Appreciate Orthopedic Surgery recommendations.   -Pain control getting much improved  -Still working with therapy services  -Please send urine analysis for completion of work-up regarding this urinary retention.  Suspecting very underlying urinary retention secondary to her immobility, recent surgery, narcotics.  -If not voiding freely kindly administer alternate warm and cold compress on bladder area until voiding freely at least every 15 to 20 minutes.  -Appreciate  was working regarding discharge planning disposition.  Likely will be needing TCU placement  -Echocardiogram appears to be reassuring with normal LVEF and LV size        Problem List:   Mechanical Fall  Recurrent Falls  L Humeral Fracture with Dislocation of Humeral Head  L Radial Fracture  L Patellar Fracture  - Appreciate Orthopedic surgery recommendations   - Pain control per ortho  - PT/OT  -Social service input    #Acute blood loss anemia secondary to recent fracture and surgery, not requiring any packed RBC transfusion yet     Systolic Murmur  -Reassuring  echocardiogram with trileaflet aortic sclerosis with preserved EF     Glaucoma  - Resume PTA latanoprost and timolol eye drops     Chronic T5 Compression Fracture  - As seen on CT L shoulder in the ED    Hypertension  -No prior known history but monitoring of blood pressure levels here showing persistently elevated levels  -We will do a trial of low-dose amlodipine for now  -Heart rate currently at a slow side at high 50s to low 60s.         Consultations This Hospital Stay   ORTHOPEDIC SURGERY IP CONSULT  CARE MANAGEMENT / SOCIAL WORK IP CONSULT  PHYSICAL THERAPY ADULT IP CONSULT  OCCUPATIONAL THERAPY ADULT IP CONSULT  PHYSICAL THERAPY ADULT IP CONSULT  OCCUPATIONAL THERAPY ADULT IP CONSULT  PHYSICAL THERAPY ADULT IP CONSULT  OCCUPATIONAL THERAPY ADULT IP CONSULT    Code Status   Full Code    Time Spent on this Encounter   I, Abdifatah Oliva MD, MD, personally saw the patient today and spent greater than 30 minutes discharging this patient.       Abdifatah Oliva MD, MD  [facility]  [address] [place] [address]  Phone: [phone]  Fax: [phone]  ______________________________________________________________________    Physical Exam   Vital Signs: Temp: 97.1  F (36.2  C) Temp src: Temporal BP: (!) 161/74 Pulse: 60   Resp: 16 SpO2: 96 % O2 Device: None (Room air)    Weight: 162 lbs .61 oz  HEENT; Atraumatic, normocephalic, pinkish conjuctiva, pupils bilateral reactive   Skin: warm and moist, no rashes  De Anda catheter present secured  Lungs: equal chest expansion, clear to auscultation, no wheezes, no stridor, no crackles,   Heart: normal rate, normal rhythm, no rubs or gallops.   Abdomen: normal bowel sounds, no tenderness, no peritoneal signs, no guarding  Extremities: no deformities, dressing left lower extremity seen, sling left upper extremity  Neuro; follow commands, alert and oriented x3, spontaneous speech, coherent, moves all extremities  spontaneously  Psych; no hallucination, euthymic mood, not agitated         Primary Care Physician   Physician No Ref-Primary    Discharge Orders      Reason for your hospital stay    Left comminuted patella fracture (ORIF of the patella) and lateral tibial plateau fracture extends into the medial tibial plateau  Left distal radius fracture and proximal humerus fracture     Wound care (specify)    Site:   Left knee  Instructions:  Keep dressings clean, dry and intact.  Ok to remove Knee immobilizer for hygiene.  Knee to remain in full extension  Left shoulder and wrist:  keep splint on wrist clean and dry.  Do not immerse.  Cover with waterproof covering to shower.  Okay to remove sling on left UE for showering.     Follow Up and recommended labs and tests    Follow up with Dr. Felton at [facility] 2 weeks after surgery.  If still in TCU, can be seen by the orthopedic provider at the care facility (if this is an option).    Call the care coordinator at [phone] to arrange the appt.   [facility] [place]: [phone]  [facility] [place]: [phone]  [facility] [place]:[phone]  Walk-in clinic 8am-8pm     Activity - Up with assistive device    Up with walker/assist     Weight bearing status    NON-WB  Left upper or lower extremities.  KI to be applied at all times when out of bed.  Okay to remove for hygiene and daily skin checks.     NO CPM     General info for SNF    Length of Stay Estimate: Short Term Care: Estimated # of Days <30  Condition at Discharge: Improving  Level of care:skilled   Rehabilitation Potential: Good  Admission H&P remains valid and up-to-date: Yes  Recent Chemotherapy: N/A  Use Nursing Home Standing Orders: Yes     Mantoux instructions    Give two-step Mantoux (PPD) Per Facility Policy Yes     Follow Up and recommended labs and tests    Follow up with Nursing home physician.    Follow up with ortho as scheduled     Reason for your hospital stay    This very pleasant lady was earlier admitted  in the hospital after a mechanical fall and sustaining bony fractures that needing operative intervention that she tolerated well.   Found with elevated BP levels and started on oral antihypertensives and continued upon discharge     Weight bearing status    As per ortho and therapy instructions     De Anda catheter    Kindly consider voiding trial in the next 7 days at the transitional care unit    To straight gravity drainage. Change catheter every 2 weeks and PRN for leaking or decreased urine output with signs of bladder distention. DO NOT change catheter without a specific Provider order IF diagnosis of benign prostatic hypertrophy (BPH), neurogenic bladder, or other urological conditions     Full Code     Physical Therapy Adult Consult    Evaluate and treat as clinically indicated.    Reason:  Left comminuted patella fracture (ORIF of the patella) and lateral tibial plateau fracture extends into the medial tibial plateau  Left distal radius fracture and proximal humerus fracture     Occupational Therapy Adult Consult    Evaluate and treat as clinically indicated.    Reason:  Left comminuted patella fracture (ORIF of the patella) and lateral tibial plateau fracture extends into the medial tibial plateau  Left distal radius fracture and proximal humerus fracture     Physical Therapy Adult Consult    Evaluate and treat as clinically indicated.    Reason: deconditioning     Occupational Therapy Adult Consult    Evaluate and treat as clinically indicated.    Reason:  deconditioniong     Fall precautions     Fall precautions     Diet    Follow this diet upon discharge: Orders Placed This Encounter      Advance Diet as Tolerated: Regular Diet Adult       Significant Results and Procedures   Most Recent 3 CBC's:  Recent Labs   Lab Test 10/11/23  0814 10/10/23  0659 10/09/23  0549 10/08/23  0737   WBC 9.7  --  12.5* 11.9*   HGB 10.1* 9.9* 10.8* 12.1   MCV 95  --  96 93     --  212 229     Most Recent 3  BMP's:  Recent Labs   Lab Test 10/12/23  0554 10/11/23  0814 10/10/23  0659 10/09/23  0549    139  --  139   POTASSIUM 3.4 3.7  --  4.3   CHLORIDE 107 105  --  107   CO2 25 26  --  22   BUN 18.0 20.7  --  20.1   CR 0.88 0.93  --  0.95   ANIONGAP 6* 8  --  10   SHARON 8.3* 8.3*  --  8.6*   * 115* 116* 129*     Most Recent 2 LFT's:No lab results found.  Most Recent 3 INR's:No lab results found.  Most Recent 3 Troponin's:No lab results found.  Most Recent Hemoglobin A1c:No lab results found.  Most Recent 6 glucoses:  Recent Labs   Lab Test 10/12/23  0554 10/11/23  0814 10/10/23  0659 10/09/23  0549 10/08/23  0737 10/07/23  1145   * 115* 116* 129* 166* 124*     Most Recent Urinalysis:  Recent Labs   Lab Test 10/11/23  1441   COLOR Yellow   APPEARANCE Clear   URINEGLC Negative   URINEBILI Negative   URINEKETONE Negative   SG 1.021   UBLD Negative   URINEPH 5.0   PROTEIN Negative   NITRITE Negative   LEUKEST Negative   RBCU 2   WBCU 2   ,   Results for orders placed or performed during the hospital encounter of 10/07/23   XR Shoulder Left G/E 3 Views    Narrative    EXAM: XR SHOULDER LEFT G/E 3 VIEWS, XR HUMERUS LEFT G/E 2 VIEWS  LOCATION: [facility]  DATE: 10/07/2023    INDICATION: Fall. Humerus pain.  COMPARISON: None.      Impression    IMPRESSION: Mildly impacted and displaced left proximal humerus fracture centered at the humeral neck. Fracture line extension likely into the greater tuberosity. No AC separation or glenohumeral joint dislocation. Moderate acromioclavicular joint   osteoarthritis. Mild glenohumeral joint space narrowing. Small lung volumes with left basilar atelectasis. Mid to distal left humerus appears intact. No left elbow joint malalignment. Subacromial spur.    NOTE: ABNORMAL REPORT    THE DICTATION ABOVE DESCRIBES AN ABNORMALITY FOR WHICH FOLLOW-UP IS NEEDED.      Humerus XR,  G/E 2 views, left    Narrative    EXAM: XR SHOULDER LEFT G/E 3 VIEWS, XR HUMERUS  LEFT G/E 2 VIEWS  LOCATION: [facility]  DATE: 10/07/2023    INDICATION: Fall. Humerus pain.  COMPARISON: None.      Impression    IMPRESSION: Mildly impacted and displaced left proximal humerus fracture centered at the humeral neck. Fracture line extension likely into the greater tuberosity. No AC separation or glenohumeral joint dislocation. Moderate acromioclavicular joint   osteoarthritis. Mild glenohumeral joint space narrowing. Small lung volumes with left basilar atelectasis. Mid to distal left humerus appears intact. No left elbow joint malalignment. Subacromial spur.    NOTE: ABNORMAL REPORT    THE DICTATION ABOVE DESCRIBES AN ABNORMALITY FOR WHICH FOLLOW-UP IS NEEDED.      Clavicle XR, left    Narrative    EXAM: XR CLAVICLE LEFT 2 VIEWS  LOCATION: [facility]  DATE: 10/07/2023.    INDICATION: Fall. Pain.  COMPARISON: Shoulder and humerus radiographic exam today.      Impression    IMPRESSION: No acute displaced left clavicle fracture identified. No clavicle joint malalignment. Redemonstrated is a left proximal humerus fracture. Degenerative change left AC joint. Subacromial spur. Degenerative change visualized cervicothoracic   spine.     XR Tibia and Fibula Left 2 Views    Narrative    EXAM: XR TIBIA AND FIBULA LEFT 2 VIEWS  LOCATION: [facility]  DATE: 10/07/2023    INDICATION: Fall. Pain.  COMPARISON: None.      Impression    IMPRESSION: Anatomic alignment left tibia and fibula. Subtle linear lucencies in the proximal left tibial metaphysis could represent summation artifact or nondisplaced fracture. No definitive fibula fracture. No focal periosteal reaction. Diffuse bone   demineralization. Arterial calcification and soft tissue calcinosis. Mid to distal leg soft tissue swelling.     XR Knee Left 1/2 Views    Narrative    EXAM: XR KNEE LEFT 1/2 VIEWS  LOCATION: [facility]  DATE: 10/07/2023    INDICATION: Fall,  pain.  COMPARISON: None.      Impression    IMPRESSION: Comminuted distracted left patella fracture. Left knee lipohemarthrosis. No distal femur, proximal tibia or proximal fibula fracture. Mild degenerative change in the medial and patellofemoral compartments of the left knee. Arterial   calcification. Knee soft tissue swelling.    NOTE: ABNORMAL REPORT    THE DICTATION ABOVE DESCRIBES AN ABNORMALITY FOR WHICH FOLLOW-UP IS NEEDED.      Radius/Ulna XR,  PA &LAT, left    Narrative    EXAM: XR FOREARM LEFT 2 VIEWS  LOCATION: [facility]  DATE: 10/7/2023    INDICATION: pain, fall  COMPARISON: None.      Impression    IMPRESSION: Acute distal radial metaphysis fracture with mild impaction and dorsal displacement. There is intra-articular extension. Subtle linear lucency in the distal ulna on oblique view is concerning for an acute nondisplaced fracture. Surrounding   soft tissue swelling. There is normal joint alignment. Osteopenia. Vascular calcification.   CT Tibia Fibula Lower Leg Left wo Contrast    Narrative    EXAM: CT TIBIA FIBULA LOWER LEG LEFT WO CONTRAST  LOCATION: [facility]  DATE: 10/7/2023    INDICATION: left proximal knee pain  COMPARISON: Radiographs from earlier today  TECHNIQUE: Noncontrast. Axial, sagittal and coronal thin-section reconstruction. Dose reduction techniques were used.     FINDINGS:     BONES AND JOINTS:  -Acute transversely oriented fracture of the mid patella with posterior displacement of the distal fracture fragment. There is intra-articular extension and multiple small fracture fragments. Acute impaction fracture of the lateral tibial plateau   posteriorly with 6 mm articular surface depression. There is a nondisplaced fracture line in the proximal tibia anteriorly which extends to the medial tibial plateau and tibial tuberosity. Mild cartilage thinning in the medial compartment. Osteopenia.   Large lipohemarthrosis. No intra-articular  body.    SOFT TISSUES:  -Mild soft tissue swelling anteriorly. No discrete fluid collection. Semimembranosus fatty muscle atrophy. Atherosclerosis.      Impression    IMPRESSION:  1.  Acute comminuted and displaced patellar fracture.  2.  Lateral tibial plateau impaction fracture posteriorly.  3.  Nondisplaced fracture line extends to the medial tibial plateau and tibial tuberosity.   4.  Large lipohemarthrosis.  5.  Other ancillary findings as described above.     XR Shoulder Left 2 Views    Narrative    EXAM: XR SHOULDER LEFT 2 VIEWS  LOCATION: [facility]  DATE: 10/7/2023    INDICATION: ?dislocation s p attempt at reduction  COMPARISON: Earlier today      Impression    IMPRESSION: Proximal humerus fracture with similar displacement and impaction. There is mild inferior subluxation of the humeral head relative to the glenoid. Otherwise unchanged.   CT Shoulder Left w/o Contrast    Narrative    EXAM: CT SHOULDER LEFT W/O CONTRAST  LOCATION: [facility]  DATE: 10/7/2023    INDICATION: pain, fracture, further evaluation location  COMPARISON: Radiographs from earlier today  TECHNIQUE: Noncontrast. Axial, sagittal and coronal thin-section reconstruction. Dose reduction techniques were used.     FINDINGS:     BONES AND JOINTS:  -Acute fracture of the proximal humerus at the surgical neck with approximately 1 cm impaction. There is comminution with minimally displaced fracture fragments. Fracture lines extend into the greater tuberosity. There is normal joint alignment. Moderate   acromioclavicular joint degenerative changes. Chronic appearing T5 vertebral body compression fracture with 60% height loss and sclerosis. Degenerative changes of the visualized spine. Osteopenia. Large glenohumeral joint lipohemarthrosis which involves   the subdeltoid bursa.    SOFT TISSUES:  -Mild soft tissue swelling around the fracture. No discrete fluid collection. The musculature appears within  normal limits. Atherosclerosis. Moderate acromioclavicular joint degenerative changes.      Impression    IMPRESSION:  1.  Acute proximal left humerus impaction fracture with comminution.  2.  There is normal joint alignment.  3.  Large glenohumeral joint lipohemarthrosis which involves the subdeltoid bursa.  4.  Chronic appearing T5 vertebral body compression fracture.  5.  Other ancillary findings as described above.     XR Surgery BRISEIDA L/T 5 Min Fluoro w Stills    Narrative    This exam was marked as non-reportable because it will not be read by a   radiologist or a [place] non-radiologist provider.         Echocardiogram Complete     Value    LVEF  60-65%    Narrative    [id number]  [id number]  [id number]  [id number]^[id number]^VIVEK     [facility]  Echocardiography Laboratory  [address] [place] [address]     Name: RODRIGUEZ AGUILERA  MRN: [id number]  : 1955  Study Date: 10/08/2023 12:30 PM  Age: 68 yrs  Gender: Female  [facility] [place]  Reason For Study: Murmur  Ordering Physician: VIVEK SAXENA  Performed By: Donnell Cole Dr     BSA: 1.8 m2  Height: 64 in  Weight: 162 lb  HR: 58  BP: 180/85 mmHg  ______________________________________________________________________________  Procedure  Complete Portable Echo Adult. Optison (NDC #7618-7492) given intravenously.  Technically difficult study.  ______________________________________________________________________________  Interpretation Summary     Left ventricular systolic function is normal.  The visual ejection fraction is 60-65%.  The left ventricle is normal in size.  There is moderate trileaflet aortic sclerosis.  The left atrium is mild to moderately dilated.     There are no old studies for comparison  ______________________________________________________________________________  Left Ventricle  The left ventricle is normal in size. There is normal left ventricular wall  thickness. Left ventricular systolic function is  normal. The visual ejection  fraction is 60-65%. Grade I or early diastolic dysfunction. Normal left  ventricular wall motion. There is no thrombus seen in the left ventricle.     Right Ventricle  The right ventricle is normal in structure, function and size. There is no  mass or thrombus in the right ventricle.     Atria  The left atrium is mild to moderately dilated. Right atrial size is normal.  There is no atrial shunt seen. The left atrial appendage is not well  visualized.     Mitral Valve  Calcified mitral apparatus. There is no mitral regurgitation noted. There is  no mitral valve stenosis.     Tricuspid Valve  Normal tricuspid valve. There is mild (1+) tricuspid regurgitation. The right  ventricular systolic pressure is approximated at 29.2 mmHg plus the right  atrial pressure. There is no tricuspid stenosis.     Aortic Valve  There is moderate trileaflet aortic sclerosis. The mean AoV pressure gradient  is 11.0 mmHg.     Pulmonic Valve  Normal pulmonic valve. There is no pulmonic valvular regurgitation. There is  no pulmonic valvular stenosis.     Vessels  The aortic root is normal size. Normal size ascending aorta. The inferior vena  cava is normal. The pulmonary artery is normal size.     Pericardium  The pericardium appears normal. There is no pleural effusion.     Rhythm  Sinus rhythm was noted.  ______________________________________________________________________________  MMode/2D Measurements & Calculations     Ao root diam: 3.1 cm  asc Aorta Diam: 3.7 cm  LVOT diam: 2.1 cm  LVOT area: 3.5 cm2  Ao root diam index Ht(cm/m): 1.9  Ao root diam index BSA (cm/m2): 1.7  Asc Ao diam index BSA (cm/m2): 2.1  Asc Ao diam index Ht(cm/m): 2.3  RV Base: 3.8 cm  TAPSE: 2.5 cm     Doppler Measurements & Calculations  MV E max michael: 101.0 cm/sec  MV A max michael: 121.0 cm/sec  MV E/A: 0.83  MV max P.3 mmHg  MV mean P.3 mmHg  MV V2 VTI: 45.1 cm  MVA(VTI): 2.5 cm2  MV dec time: 0.30 sec  Ao V2 max: 219.5  cm/sec  Ao max P.0 mmHg  Ao V2 mean: 149.0 cm/sec  Ao mean P.0 mmHg  Ao V2 VTI: 51.5 cm  BOBO(I,D): 2.2 cm2  BOBO(V,D): 2.2 cm2  LV V1 max P.2 mmHg  LV V1 max: 134.0 cm/sec  LV V1 VTI: 31.4 cm     SV(LVOT): 111.2 ml  SI(LVOT): 62.2 ml/m2  PA V2 max: 94.5 cm/sec  PA max PG: 3.6 mmHg  PA acc time: 0.14 sec  TR max eulogio: 270.0 cm/sec  TR max P.2 mmHg  AV Eulogio Ratio (DI): 0.61  BOBO Index (cm2/m2): 1.2  E/E' av.1  Lateral E/e': 8.1  Medial E/e': 16.0  RV S Eulogio: 13.7 cm/sec     ______________________________________________________________________________  Report approved by: Dr. Blaine Perry 10/08/2023 02:25 PM             Discharge Medications   Current Discharge Medication List        START taking these medications    Details   acetaminophen (TYLENOL) 325 MG tablet Take 3 tablets (975 mg) by mouth every 8 hours  Qty: 30 tablet    Associated Diagnoses: Other closed displaced fracture of proximal end of left humerus, initial encounter; Closed fracture of distal end of left radius, unspecified fracture morphology, initial encounter; Closed displaced comminuted fracture of left patella, initial encounter; Closed fracture of proximal end of left tibia, unspecified fracture morphology, initial encounter; Shoulder dislocation, left, initial encounter      amLODIPine (NORVASC) 2.5 MG tablet Take 1 tablet (2.5 mg) by mouth daily  Qty: 30 tablet, Refills: 0    Associated Diagnoses: Closed fracture of distal end of left radius, unspecified fracture morphology, initial encounter      aspirin (ASA) 325 MG EC tablet Take 1 tablet (325 mg) by mouth daily  Qty: 30 tablet    Associated Diagnoses: Other closed displaced fracture of proximal end of left humerus, initial encounter; Closed fracture of distal end of left radius, unspecified fracture morphology, initial encounter; Closed displaced comminuted fracture of left patella, initial encounter; Closed fracture of proximal end of left tibia, unspecified  "fracture morphology, initial encounter; Shoulder dislocation, left, initial encounter      oxyCODONE (ROXICODONE) 5 MG tablet Take 1-2 tablets (5-10 mg) by mouth every 4 hours as needed for severe pain (IF pain not managed with non-pharmacological and non-opioid interventions) 1 tab po q 4 hrs prn pain scale \"3-6\"  2 tabs po q 4 hrs prn pain scale \"7-10\"  Qty: 25 tablet, Refills: 0    Associated Diagnoses: Other closed displaced fracture of proximal end of left humerus, initial encounter; Closed fracture of distal end of left radius, unspecified fracture morphology, initial encounter; Closed displaced comminuted fracture of left patella, initial encounter; Closed fracture of proximal end of left tibia, unspecified fracture morphology, initial encounter; Shoulder dislocation, left, initial encounter      senna-docusate (SENOKOT-S/PERICOLACE) 8.6-50 MG tablet Take 1 tablet by mouth 2 times daily as needed for constipation  Qty: 20 tablet    Associated Diagnoses: Other closed displaced fracture of proximal end of left humerus, initial encounter; Closed fracture of distal end of left radius, unspecified fracture morphology, initial encounter; Closed displaced comminuted fracture of left patella, initial encounter; Closed fracture of proximal end of left tibia, unspecified fracture morphology, initial encounter; Shoulder dislocation, left, initial encounter           CONTINUE these medications which have NOT CHANGED    Details   latanoprost (XALATAN) 0.005 % ophthalmic solution Place 1 drop into both eyes daily      timolol maleate (TIMOPTIC) 0.5 % ophthalmic solution Place 1 drop into both eyes every morning           Allergies   Allergies   Allergen Reactions    Chicken-Derived Products (Egg) Nausea and Vomiting    Codeine     Milk (Cow) Nausea     "

## (undated) DEVICE — TOURNIQUET SGL  BLADDER 30"X4" BLUE 5921030135

## (undated) DEVICE — PACK LOWER EXTREMITY RIDGES

## (undated) DEVICE — IMM KNEE 24" 08142674

## (undated) DEVICE — KIT ANCHOR SU Q-FIX 2.8MM W/DRILL GD OBTURATOR 25-2810

## (undated) DEVICE — BAG CLEAR TRASH 1.3M 39X33" P4040C

## (undated) DEVICE — ESU GROUND PAD ADULT W/CORD E7507

## (undated) DEVICE — DRAPE X-RAY TUBE 00-901169-01-OEC

## (undated) DEVICE — SPONGE LAP 18X18" X8435

## (undated) DEVICE — LINEN FULL SHEET 5511

## (undated) DEVICE — DRAPE STOCKINETTE IMPERVIOUS 12" 1587

## (undated) DEVICE — SU VICRYL 2-0 CT-2 27" UND J269H

## (undated) DEVICE — SU MONOCRYL 3-0 PS-2 27" Y427H

## (undated) DEVICE — DRSG AQUACEL AG HYDROFIBER  3.5X10" 422605

## (undated) DEVICE — STPL SKIN 35W 6.9MM  PXW35

## (undated) DEVICE — SU NDL MAYO 1824-4

## (undated) DEVICE — DRAPE C-ARMOR 5 SIDED 5523

## (undated) DEVICE — PREP CHLORAPREP 26ML TINTED HI-LITE ORANGE 930815

## (undated) DEVICE — SU VICRYL 0 CT-1 27" J340H

## (undated) DEVICE — GLOVE BIOGEL PI MICRO INDICATOR UNDERGLOVE SZ 8.0 48980

## (undated) DEVICE — Device

## (undated) DEVICE — MANIFOLD NEPTUNE 4 PORT 700-20

## (undated) DEVICE — LINEN HALF SHEET 5512

## (undated) DEVICE — GLOVE BIOGEL PI SZ 8.0 40880

## (undated) RX ORDER — HYDRALAZINE HYDROCHLORIDE 20 MG/ML
INJECTION INTRAMUSCULAR; INTRAVENOUS
Status: DISPENSED
Start: 2023-10-08

## (undated) RX ORDER — FENTANYL CITRATE 50 UG/ML
INJECTION, SOLUTION INTRAMUSCULAR; INTRAVENOUS
Status: DISPENSED
Start: 2023-10-08

## (undated) RX ORDER — ROPIVACAINE HYDROCHLORIDE 7.5 MG/ML
INJECTION, SOLUTION EPIDURAL; PERINEURAL
Status: DISPENSED
Start: 2023-10-08

## (undated) RX ORDER — CEFAZOLIN SODIUM/WATER 2 G/20 ML
SYRINGE (ML) INTRAVENOUS
Status: DISPENSED
Start: 2023-10-08